# Patient Record
Sex: FEMALE | Race: WHITE | NOT HISPANIC OR LATINO | Employment: OTHER | URBAN - METROPOLITAN AREA
[De-identification: names, ages, dates, MRNs, and addresses within clinical notes are randomized per-mention and may not be internally consistent; named-entity substitution may affect disease eponyms.]

---

## 2020-01-29 ENCOUNTER — OFFICE VISIT (OUTPATIENT)
Dept: MEDICAL GROUP | Facility: IMAGING CENTER | Age: 70
End: 2020-01-29
Payer: MEDICARE

## 2020-01-29 VITALS
DIASTOLIC BLOOD PRESSURE: 84 MMHG | HEART RATE: 83 BPM | HEIGHT: 64 IN | BODY MASS INDEX: 37.9 KG/M2 | RESPIRATION RATE: 15 BRPM | TEMPERATURE: 98.8 F | SYSTOLIC BLOOD PRESSURE: 124 MMHG | WEIGHT: 222 LBS | OXYGEN SATURATION: 96 %

## 2020-01-29 DIAGNOSIS — J45.20 MILD INTERMITTENT ASTHMA WITHOUT COMPLICATION: ICD-10-CM

## 2020-01-29 DIAGNOSIS — Z12.11 COLON CANCER SCREENING: ICD-10-CM

## 2020-01-29 DIAGNOSIS — Z13.1 DIABETES MELLITUS SCREENING: ICD-10-CM

## 2020-01-29 DIAGNOSIS — Z11.59 ENCOUNTER FOR HEPATITIS C SCREENING TEST FOR LOW RISK PATIENT: ICD-10-CM

## 2020-01-29 DIAGNOSIS — Z12.39 BREAST CANCER SCREENING: ICD-10-CM

## 2020-01-29 DIAGNOSIS — H91.93 BILATERAL HEARING LOSS, UNSPECIFIED HEARING LOSS TYPE: ICD-10-CM

## 2020-01-29 DIAGNOSIS — Z12.31 ENCOUNTER FOR SCREENING MAMMOGRAM FOR MALIGNANT NEOPLASM OF BREAST: ICD-10-CM

## 2020-01-29 DIAGNOSIS — R42 VERTIGO: ICD-10-CM

## 2020-01-29 DIAGNOSIS — Z13.220 SCREENING CHOLESTEROL LEVEL: ICD-10-CM

## 2020-01-29 DIAGNOSIS — Z13.820 OSTEOPOROSIS SCREENING: ICD-10-CM

## 2020-01-29 DIAGNOSIS — Z00.00 ANNUAL PHYSICAL EXAM: ICD-10-CM

## 2020-01-29 PROCEDURE — 99214 OFFICE O/P EST MOD 30 MIN: CPT | Performed by: FAMILY MEDICINE

## 2020-01-29 RX ORDER — M-VIT,TX,IRON,MINS/CALC/FOLIC 27MG-0.4MG
1 TABLET ORAL DAILY
COMMUNITY

## 2020-01-29 SDOH — HEALTH STABILITY: MENTAL HEALTH: HOW OFTEN DO YOU HAVE A DRINK CONTAINING ALCOHOL?: NEVER

## 2020-01-29 ASSESSMENT — PAIN SCALES - GENERAL: PAINLEVEL: NO PAIN

## 2020-01-29 NOTE — PROGRESS NOTES
Subjective:     CC:    Chief Complaint   Patient presents with   • Other     mammogram 15-18 years ago. never had dexa. colonosocpy 3-herlinda years ago, came back normal.    • Vertigo     vertigo started 6-7 years ago. random attacks. can't figure out the trigger.   • Hearing Problem     left ear. progressive, now can't hear anything in left ear.       HISTORY OF THE PRESENT ILLNESS: This pleasant 69 y.o. female with no chronic medical conditions is here to establish care and discuss a few concerns. His/her prior PCP was Josey patrick in [facility].    Hearing loss started after a severe ear infection about 10 years ago she has seen an ENT for this. Last vertigo nov 2019 last 30 minutes. Once every three months she gets vertigo episodes that resolve on their own.  She also experiences ringing in her ears mostly in the left though both also for the last 7 years.  She reports that since her initial hearing loss about 10 years ago she feels recently that it is gotten worse.  She currently does not have any symptoms.      Allergies: Ampicillin and Penicillins    Current Outpatient Medications Ordered in Epic   Medication Sig Dispense Refill   • therapeutic multivitamin-minerals (THERAGRAN-M) Tab Take 1 Tab by mouth every day.     • NON SPECIFIED      • Probiotic Product (PROBIOTIC DAILY PO) Take  by mouth.       No current Epic-ordered facility-administered medications on file.        History reviewed. No pertinent past medical history.    History reviewed. No pertinent surgical history.    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   • Drug use: Never       Social History     Patient does not qualify to have social determinant information on file (likely too young).   Social History Narrative   • Not on file       History reviewed. No  "pertinent family history.    ROS:   Gen: no fevers/chills, no changes in weight  Eyes: no changes in vision  ENT: no sore throat, with hearing loss  Pulm: no sob, no cough  CV: no chest pain, no palpitations  GI: no nausea/vomiting, no diarrhea  : no dysuria  MSk: no myalgias  Skin: no rash  Neuro: no headaches, no numbness/tingling  Heme/Lymph: no easy bruising      Objective:     Exam: /84 (BP Location: Left arm, Patient Position: Sitting, BP Cuff Size: Adult)   Pulse 83   Temp 37.1 °C (98.8 °F) (Temporal)   Resp 15   Ht 1.626 m (5' 4\")   Wt 100.7 kg (222 lb)   SpO2 96%  Body mass index is 38.11 kg/m².    General: Normal appearing. No distress.  HEENT: Normocephalic. Eyes conjunctiva clear lids without ptosis, eomi. bilateral ear canals within normal limits tympanic membranes bilaterally with good light reflex no erythema no fluid behind the ear no bulging  Neck: Thyroid is not enlarged.  Pulmonary: Clear to ausculation.  Normal effort. No rales, ronchi, or wheezing.  Cardiovascular: Regular rate and rhythm without murmur. Carotid and radial pulses are intact and equal bilaterally.  Neurologic: No facial droop, normal gait, alert and oriented  Lymph: No cervical or supraclavicular lymph nodes are palpable  Skin: Warm and dry.  No obvious lesions.  Musculoskeletal: No extremity cyanosis, clubbing, or edema.  Psych: Normal mood and affect. Judgment and insight is normal.      Assessment & Plan:   69 y.o. female with the following -  Sending referral to ENT to establish care.  Discussed that without current symptoms Epley maneuver would do nothing for her.  I offered medication to try when she gets symptoms she prefers to wait.  Problem List Items Addressed This Visit     Mild intermittent asthma without complication      Other Visit Diagnoses     Encounter for hepatitis C screening test for low risk patient        Relevant Orders    HEP C VIRUS ANTIBODY    Breast cancer screening        Relevant Orders "    MA-SCREENING MAMMO BILAT W/TOMOSYNTHESIS W/CAD    Colon cancer screening        Screening cholesterol level        Relevant Orders    Lipid Profile    Diabetes mellitus screening        Annual physical exam        Relevant Orders    CBC WITH DIFFERENTIAL    Comp Metabolic Panel    Osteoporosis screening        Bilateral hearing loss, unspecified hearing loss type        Relevant Orders    POCT OAE HEARING SCREENING    REFERRAL TO ENT    Vertigo        Relevant Orders    REFERRAL TO ENT    Encounter for screening mammogram for malignant neoplasm of breast         Relevant Orders    MA-SCREENING MAMMO BILAT W/TOMOSYNTHESIS W/CAD        Problem   Mild Intermittent Asthma Without Complication    primatine mist not using labuterol       No follow-ups on file.    Please note that this dictation was created using voice recognition software. I have made every reasonable attempt to correct obvious errors, but I expect that there are errors of grammar and possibly content that I did not discover before finalizing the note.

## 2020-01-31 ENCOUNTER — HOSPITAL ENCOUNTER (OUTPATIENT)
Dept: LAB | Facility: MEDICAL CENTER | Age: 70
End: 2020-01-31
Attending: FAMILY MEDICINE
Payer: MEDICARE

## 2020-01-31 DIAGNOSIS — Z13.220 SCREENING CHOLESTEROL LEVEL: ICD-10-CM

## 2020-01-31 DIAGNOSIS — Z00.00 ANNUAL PHYSICAL EXAM: ICD-10-CM

## 2020-01-31 DIAGNOSIS — Z11.59 ENCOUNTER FOR HEPATITIS C SCREENING TEST FOR LOW RISK PATIENT: ICD-10-CM

## 2020-01-31 LAB
ALBUMIN SERPL BCP-MCNC: 4.2 G/DL (ref 3.2–4.9)
ALBUMIN/GLOB SERPL: 1.4 G/DL
ALP SERPL-CCNC: 63 U/L (ref 30–99)
ALT SERPL-CCNC: 11 U/L (ref 2–50)
ANION GAP SERPL CALC-SCNC: 6 MMOL/L (ref 0–11.9)
AST SERPL-CCNC: 16 U/L (ref 12–45)
BASOPHILS # BLD AUTO: 0.8 % (ref 0–1.8)
BASOPHILS # BLD: 0.05 K/UL (ref 0–0.12)
BILIRUB SERPL-MCNC: 0.6 MG/DL (ref 0.1–1.5)
BUN SERPL-MCNC: 17 MG/DL (ref 8–22)
CALCIUM SERPL-MCNC: 10.2 MG/DL (ref 8.5–10.5)
CHLORIDE SERPL-SCNC: 105 MMOL/L (ref 96–112)
CHOLEST SERPL-MCNC: 245 MG/DL (ref 100–199)
CO2 SERPL-SCNC: 29 MMOL/L (ref 20–33)
CREAT SERPL-MCNC: 0.86 MG/DL (ref 0.5–1.4)
EOSINOPHIL # BLD AUTO: 0.3 K/UL (ref 0–0.51)
EOSINOPHIL NFR BLD: 5 % (ref 0–6.9)
ERYTHROCYTE [DISTWIDTH] IN BLOOD BY AUTOMATED COUNT: 44.2 FL (ref 35.9–50)
GLOBULIN SER CALC-MCNC: 3.1 G/DL (ref 1.9–3.5)
GLUCOSE SERPL-MCNC: 97 MG/DL (ref 65–99)
HCT VFR BLD AUTO: 44.9 % (ref 37–47)
HCV AB SER QL: NEGATIVE
HDLC SERPL-MCNC: 46 MG/DL
HGB BLD-MCNC: 14.5 G/DL (ref 12–16)
IMM GRANULOCYTES # BLD AUTO: 0.02 K/UL (ref 0–0.11)
IMM GRANULOCYTES NFR BLD AUTO: 0.3 % (ref 0–0.9)
LDLC SERPL CALC-MCNC: 166 MG/DL
LYMPHOCYTES # BLD AUTO: 2.15 K/UL (ref 1–4.8)
LYMPHOCYTES NFR BLD: 36 % (ref 22–41)
MCH RBC QN AUTO: 29.5 PG (ref 27–33)
MCHC RBC AUTO-ENTMCNC: 32.3 G/DL (ref 33.6–35)
MCV RBC AUTO: 91.3 FL (ref 81.4–97.8)
MONOCYTES # BLD AUTO: 0.43 K/UL (ref 0–0.85)
MONOCYTES NFR BLD AUTO: 7.2 % (ref 0–13.4)
NEUTROPHILS # BLD AUTO: 3.03 K/UL (ref 2–7.15)
NEUTROPHILS NFR BLD: 50.7 % (ref 44–72)
NRBC # BLD AUTO: 0 K/UL
NRBC BLD-RTO: 0 /100 WBC
PLATELET # BLD AUTO: 262 K/UL (ref 164–446)
PMV BLD AUTO: 11 FL (ref 9–12.9)
POTASSIUM SERPL-SCNC: 4.4 MMOL/L (ref 3.6–5.5)
PROT SERPL-MCNC: 7.3 G/DL (ref 6–8.2)
RBC # BLD AUTO: 4.92 M/UL (ref 4.2–5.4)
SODIUM SERPL-SCNC: 140 MMOL/L (ref 135–145)
TRIGL SERPL-MCNC: 165 MG/DL (ref 0–149)
WBC # BLD AUTO: 6 K/UL (ref 4.8–10.8)

## 2020-01-31 PROCEDURE — 80061 LIPID PANEL: CPT

## 2020-01-31 PROCEDURE — 85025 COMPLETE CBC W/AUTO DIFF WBC: CPT

## 2020-01-31 PROCEDURE — 80053 COMPREHEN METABOLIC PANEL: CPT

## 2020-01-31 PROCEDURE — 86803 HEPATITIS C AB TEST: CPT

## 2020-01-31 PROCEDURE — 36415 COLL VENOUS BLD VENIPUNCTURE: CPT

## 2020-02-07 ENCOUNTER — OFFICE VISIT (OUTPATIENT)
Dept: MEDICAL GROUP | Facility: IMAGING CENTER | Age: 70
End: 2020-02-07
Payer: MEDICARE

## 2020-02-07 VITALS
TEMPERATURE: 98.2 F | HEART RATE: 82 BPM | DIASTOLIC BLOOD PRESSURE: 80 MMHG | SYSTOLIC BLOOD PRESSURE: 140 MMHG | WEIGHT: 222 LBS | HEIGHT: 64 IN | OXYGEN SATURATION: 94 % | BODY MASS INDEX: 37.9 KG/M2 | RESPIRATION RATE: 12 BRPM

## 2020-02-07 DIAGNOSIS — J45.20 MILD INTERMITTENT ASTHMA WITHOUT COMPLICATION: ICD-10-CM

## 2020-02-07 DIAGNOSIS — L40.9 PSORIASIS: ICD-10-CM

## 2020-02-07 DIAGNOSIS — Z23 NEED FOR VACCINATION AGAINST STREPTOCOCCUS PNEUMONIAE USING PNEUMOCOCCAL CONJUGATE VACCINE 13: ICD-10-CM

## 2020-02-07 DIAGNOSIS — Z12.83 SKIN CANCER SCREENING: ICD-10-CM

## 2020-02-07 DIAGNOSIS — Z23 NEED FOR TDAP VACCINATION: ICD-10-CM

## 2020-02-07 DIAGNOSIS — E78.2 MIXED HYPERLIPIDEMIA: ICD-10-CM

## 2020-02-07 PROCEDURE — 90472 IMMUNIZATION ADMIN EACH ADD: CPT | Performed by: FAMILY MEDICINE

## 2020-02-07 PROCEDURE — 90715 TDAP VACCINE 7 YRS/> IM: CPT | Performed by: FAMILY MEDICINE

## 2020-02-07 PROCEDURE — 90670 PCV13 VACCINE IM: CPT | Performed by: FAMILY MEDICINE

## 2020-02-07 PROCEDURE — 99214 OFFICE O/P EST MOD 30 MIN: CPT | Mod: 25 | Performed by: FAMILY MEDICINE

## 2020-02-07 PROCEDURE — G0009 ADMIN PNEUMOCOCCAL VACCINE: HCPCS | Performed by: FAMILY MEDICINE

## 2020-02-07 RX ORDER — TRIAMCINOLONE ACETONIDE 1 MG/G
1 CREAM TOPICAL 2 TIMES DAILY
Qty: 1 TUBE | Refills: 0 | Status: SHIPPED | OUTPATIENT
Start: 2020-02-07 | End: 2020-02-21

## 2020-02-07 RX ORDER — ALBUTEROL SULFATE 90 UG/1
2 AEROSOL, METERED RESPIRATORY (INHALATION) EVERY 4 HOURS PRN
Qty: 1 INHALER | Refills: 3 | Status: SHIPPED | OUTPATIENT
Start: 2020-02-07 | End: 2022-02-22 | Stop reason: SDUPTHER

## 2020-02-07 RX ORDER — ATORVASTATIN CALCIUM 10 MG/1
10 TABLET, FILM COATED ORAL
Qty: 90 TAB | Refills: 3 | Status: SHIPPED | OUTPATIENT
Start: 2020-02-07 | End: 2020-11-05 | Stop reason: SDUPTHER

## 2020-02-07 ASSESSMENT — PATIENT HEALTH QUESTIONNAIRE - PHQ9: CLINICAL INTERPRETATION OF PHQ2 SCORE: 0

## 2020-02-07 ASSESSMENT — PAIN SCALES - GENERAL: PAINLEVEL: 3=SLIGHT PAIN

## 2020-02-07 NOTE — PROGRESS NOTES
Subjective:     CC:    Chief Complaint   Patient presents with   • Dyslipidemia     discuss medication, used atorvastatin in the past with good results       HISTORY OF THE PRESENT ILLNESS: This pleasant 69 y.o. female is here to f/u labs.  Her LDL was 166 on her lipid panel.  She would like to start medication for this.  She reports that she has done well on atorvastatin in the past.  She is also interested in getting her Tdap and pneumococcal vaccine today.  She does have mild intermittent asthma usually does not bother her until springtime or if she gets an upper respiratory infection.  Today she is doing well without wheezing shortness of breath or cough.    Allergies: Ampicillin and Penicillins    Current Outpatient Medications Ordered in Epic   Medication Sig Dispense Refill   • therapeutic multivitamin-minerals (THERAGRAN-M) Tab Take 1 Tab by mouth every day.     • Probiotic Product (PROBIOTIC DAILY PO) Take  by mouth.     • NON SPECIFIED        No current Epic-ordered facility-administered medications on file.        History reviewed. No pertinent past medical history.    History reviewed. No pertinent surgical history.    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   • Drug use: Never       Social History     Patient does not qualify to have social determinant information on file (likely too young).   Social History Narrative   • Not on file       History reviewed. No pertinent family history.    ROS:   Gen: no fevers/chills, no changes in weight  Eyes: no changes in vision  ENT: no sore throat, no hearing loss  Pulm: no sob, no cough  CV: no chest pain, no palpitations  GI: no nausea/vomiting, no diarrhea  : no dysuria  MSk: no myalgias  Skin: w lesion on her chest no rash  Neuro: no headaches, no  "numbness/tingling  Heme/Lymph: no easy bruising      Objective:     Exam: /80   Pulse 82   Temp 36.8 °C (98.2 °F)   Resp 12   Ht 1.626 m (5' 4\")   Wt 100.7 kg (222 lb)   SpO2 94%  Body mass index is 38.11 kg/m².    General: Normal appearing. No distress.  HEENT: Normocephalic. Eyes conjunctiva clear lids without ptosis, eomi  Neck: Thyroid is not enlarged.  Pulmonary: Clear to ausculation.  Normal effort. No rales, ronchi, or wheezing.  Cardiovascular: Regular rate and rhythm without murmur. Carotid and radial pulses are intact and equal bilaterally.  Neurologic: No facial droop, normal gait, alert and oriented  Lymph: No cervical or supraclavicular lymph nodes are palpable  Skin: Warm and dry.  No obvious lesions. [place] colored scaling plaque the size of a dime on her upper center chest  Musculoskeletal: No extremity cyanosis, clubbing, or edema.  Psych: Normal mood and affect. Judgment and insight is normal.      Assessment & Plan:   69 y.o. female with the following -  She will let me know if the topical steroid does not resolve the lesion  Starting Lipitor today. Add coq10 if symptoms occur. Discussed most common symptoms fatigue and joint pains  Discussed mc se from vaccines. Pt would like to obtain tdap and pcv13 today. pcv 23 can be repeated 6-12 months from today.   Problem List Items Addressed This Visit     Mild intermittent asthma without complication    Relevant Medications    albuterol 108 (90 Base) MCG/ACT Aero Soln inhalation aerosol    Mixed hyperlipidemia    Relevant Medications    atorvastatin (LIPITOR) 10 MG Tab    Skin cancer screening    Relevant Orders    REFERRAL TO DERMATOLOGY    Psoriasis    Relevant Medications    triamcinolone acetonide (KENALOG) 0.1 % Cream      Other Visit Diagnoses     Need for vaccination against Streptococcus pneumoniae using pneumococcal conjugate vaccine 13        Relevant Orders    Prevnar 13 PCV-13 (Completed)    Need for Tdap vaccination        " Relevant Orders    Tdap =>8yo IM (Completed)        Problem   Mixed Hyperlipidemia    Starting statin  Add co-Q10 if fatigue or joint pains occur     Skin Cancer Screening   Psoriasis   Mild Intermittent Asthma Without Complication    primatine mist not using labuterol  Bothers her in the summer and if uri the primatine works well for her  She stopped albuterol due to insurance  Will send now       Return in about 3 months (around 5/7/2020), or if symptoms worsen or fail to improve.    Please note that this dictation was created using voice recognition software. I have made every reasonable attempt to correct obvious errors, but I expect that there are errors of grammar and possibly content that I did not discover before finalizing the note.

## 2020-03-04 ENCOUNTER — OFFICE VISIT (OUTPATIENT)
Dept: DERMATOLOGY | Facility: IMAGING CENTER | Age: 70
End: 2020-03-04
Payer: MEDICARE

## 2020-03-04 VITALS — BODY MASS INDEX: 37.56 KG/M2 | HEIGHT: 64 IN | WEIGHT: 220 LBS

## 2020-03-04 DIAGNOSIS — Z12.83 SKIN CANCER SCREENING: ICD-10-CM

## 2020-03-04 DIAGNOSIS — L81.4 LENTIGINES: ICD-10-CM

## 2020-03-04 DIAGNOSIS — D22.9 MULTIPLE NEVI: ICD-10-CM

## 2020-03-04 DIAGNOSIS — L82.1 SEBORRHEIC KERATOSES: ICD-10-CM

## 2020-03-04 DIAGNOSIS — D18.01 CHERRY ANGIOMA: ICD-10-CM

## 2020-03-04 DIAGNOSIS — L57.0 ACTINIC KERATOSES: ICD-10-CM

## 2020-03-04 PROCEDURE — 17000 DESTRUCT PREMALG LESION: CPT | Performed by: NURSE PRACTITIONER

## 2020-03-04 PROCEDURE — 17003 DESTRUCT PREMALG LES 2-14: CPT | Performed by: NURSE PRACTITIONER

## 2020-03-04 PROCEDURE — 99203 OFFICE O/P NEW LOW 30 MIN: CPT | Mod: 25 | Performed by: NURSE PRACTITIONER

## 2020-03-04 ASSESSMENT — ENCOUNTER SYMPTOMS
CHILLS: 0
FEVER: 0
WEIGHT LOSS: 0
DIAPHORESIS: 0

## 2020-03-04 ASSESSMENT — FIBROSIS 4 INDEX: FIB4 SCORE: 1.27

## 2020-03-04 NOTE — PROGRESS NOTES
Dermatology New Patient Visit    Chief Complaint   Patient presents with   • Establish Care       Subjective:     HPI:   Martha Hernandez is a 69 y.o. female presenting for    Full skin exam   HPI/location: mid chest red scaly spot   Time present: 6 mths   Painful lesion: No  Itching lesion: No  Enlarging lesion: No  Anything make it better or worse?    History of skin cancer: No  History of precancers/actinic keratoses: No  History of biopsies:No  History of blistering/severe sunburns:No  Family history of skin cancer:No  Family history of atypical moles:No          History reviewed. No pertinent past medical history.    Current Outpatient Medications on File Prior to Visit   Medication Sig Dispense Refill   • atorvastatin (LIPITOR) 10 MG Tab Take 1 Tab by mouth every bedtime. 90 Tab 3   • albuterol 108 (90 Base) MCG/ACT Aero Soln inhalation aerosol Inhale 2 Puffs by mouth every four hours as needed for Shortness of Breath. 1 Inhaler 3   • therapeutic multivitamin-minerals (THERAGRAN-M) Tab Take 1 Tab by mouth every day.     • NON SPECIFIED      • Probiotic Product (PROBIOTIC DAILY PO) Take  by mouth.       No current facility-administered medications on file prior to visit.        Allergies   Allergen Reactions   • Ampicillin Hives   • Penicillins        History reviewed. No pertinent family history.    Social History     Socioeconomic History   • Marital status:      Spouse name: Not on file   • Number of children: Not on file   • Years of education: Not on file   • Highest education level: Not on file   Occupational History   • Not on file   Social Needs   • Financial resource strain: Not on file   • Food insecurity     Worry: Not on file     Inability: Not on file   • Transportation needs     Medical: Not on file     Non-medical: Not on file   Tobacco Use   • Smoking status: Never Smoker   • Smokeless tobacco: Never Used   Substance and Sexual Activity   • Alcohol use: Not Currently     Frequency: Never  "    Comment: 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   • Drug use: Never   • Sexual activity: Not on file   Lifestyle   • Physical activity     Days per week: Not on file     Minutes per session: Not on file   • Stress: Not on file   Relationships   • Social connections     Talks on phone: Not on file     Gets together: Not on file     Attends Religion service: Not on file     Active member of club or organization: Not on file     Attends meetings of clubs or organizations: Not on file     Relationship status: Not on file   • Intimate partner violence     Fear of current or ex partner: Not on file     Emotionally abused: Not on file     Physically abused: Not on file     Forced sexual activity: Not on file   Other Topics Concern   • Not on file   Social History Narrative   • Not on file       Review of Systems   Constitutional: Negative for chills, diaphoresis, fever, malaise/fatigue and weight loss.   Skin: Negative for itching and rash.        Objective:     A full mucocutaneous exam was completed including: scalp, hair, ears, face, eyelids, conjunctiva, lips, gums/tongue/oropharynx, neck, chest breasts, abdomen, back, left and right upper extremities (including hands/digits and fingernails), left and right lower extremities (including feet/toes, toenails), buttocks, excluding external genitalia (patient refusal) with the following pertinent findings listed below. Remaining above-listed examined areas within normal limits / negative for rashes or lesions.    Ht 1.626 m (5' 4\")   Wt 99.8 kg (220 lb)     Physical Exam   Constitutional: She is oriented to person, place, and time and well-developed, well-nourished, and in no distress. No distress.   HENT:   Head: Normocephalic.   Right Ear: External ear normal.   Left Ear: External ear normal.   Mouth/Throat: Oropharynx is clear and moist.   -Ill-defined erythematous gritty/scaly papule over left eyebrow, left cheek, and tip of nose       Eyes: Conjunctivae are " normal.   Neck: Neck supple.   Pulmonary/Chest: Effort normal.   Lymphadenopathy:     She has no cervical adenopathy.   Neurological: She is alert and oriented to person, place, and time.   Skin: Skin is warm and dry. No rash noted. There is erythema.        -Several tan medium brown skin-colored stuck-on waxy papules scattered on the trunk and extremities    -Multiple light brown skin-colored papules scattered over the face, trunk >> extremities. All with benign-appearing pigment network patterns on dermoscopy    -Few scattered 1-3mm bright red macules and thin papules on the trunk and extremities    Several scattered tan and light brown macules over trunk and extremities       Psychiatric: Mood and affect normal.   Vitals reviewed.      DATA: none applicable to review    Assessment and Plan:     1. Actinic keratoses  CRYOTHERAPY:  Risks (including, but not limited to: hypo or hyperpigmentation, redness, blister, blood blister, recurrence, need for further treatment, infection, scar) and benefits of cryotherapy discussed. Patient verbally agreed to proceed with treatment. 2 cryotherapy freeze thaw cycles of 10 seconds were applied to 4 lesions on chest and face with cryac. Patient tolerated procedure well. Aftercare instructions given.  -will re-check AK at chest in 6 weeks and if not resolved, plan on biopsy    2. Multiple nevi  - Benign-appearing nature of lesions discussed. Advised to return to clinic for any new or concerning changes.  - ABCDE's of melanoma discussed      3. Seborrheic keratoses  - Benign-appearing nature of lesions discussed. Advised to return to clinic for any new or concerning changes.      4. Lentigines  - Discussed benign nature of lesions, related to sun exposure  - Discussed sun protection, hand out provided      5. Cherry angioma  - Benign-appearing nature of lesions discussed. Advised to return to clinic for any new or concerning changes.      6. Skin cancer screening  Skin cancer  education  - discussed importance of sun protective clothing, eyewear  - discussed importance of daily use of broad spectrum sunscreen with SPF 30 or greater, as well as need for reapplication ~every 2 hours when exposed to UVR  - discussed importance of regular self-exams, ideally once per month, every 12 months exams in clinic  - ABCDE's of melanoma discussed  - patient to bring any new or concerning lesions to my attention  - Patient educational handout provided and reviewed with patient        Followup: Return in about 6 weeks (around 4/15/2020) for spot check or sooner for any concerns.    JAZZY Chandler.

## 2020-03-10 ENCOUNTER — HOSPITAL ENCOUNTER (OUTPATIENT)
Dept: RADIOLOGY | Facility: MEDICAL CENTER | Age: 70
End: 2020-03-10
Attending: FAMILY MEDICINE
Payer: MEDICARE

## 2020-03-10 DIAGNOSIS — Z12.39 BREAST CANCER SCREENING: ICD-10-CM

## 2020-03-10 DIAGNOSIS — Z12.31 ENCOUNTER FOR SCREENING MAMMOGRAM FOR MALIGNANT NEOPLASM OF BREAST: ICD-10-CM

## 2020-03-10 PROCEDURE — 77067 SCR MAMMO BI INCL CAD: CPT

## 2020-03-13 ENCOUNTER — HOSPITAL ENCOUNTER (OUTPATIENT)
Dept: RADIOLOGY | Facility: MEDICAL CENTER | Age: 70
End: 2020-03-13
Attending: FAMILY MEDICINE
Payer: MEDICARE

## 2020-03-13 DIAGNOSIS — R92.8 ABNORMAL MAMMOGRAM: ICD-10-CM

## 2020-03-13 PROCEDURE — 77065 DX MAMMO INCL CAD UNI: CPT | Mod: LT

## 2020-03-13 PROCEDURE — 76642 ULTRASOUND BREAST LIMITED: CPT | Mod: LT

## 2020-05-05 ENCOUNTER — TELEPHONE (OUTPATIENT)
Dept: DERMATOLOGY | Facility: IMAGING CENTER | Age: 70
End: 2020-05-05

## 2020-05-05 NOTE — TELEPHONE ENCOUNTER
Called pt about Derm visit changes to virtual visits. Was due to come in 5/12 for spot check. Pt stated that spot looks much better, little to no redness and almost gone. She was okay to cancel for now. She was advised to call back should things change.

## 2020-08-17 ENCOUNTER — PATIENT MESSAGE (OUTPATIENT)
Dept: MEDICAL GROUP | Facility: IMAGING CENTER | Age: 70
End: 2020-08-17

## 2020-08-17 DIAGNOSIS — R92.8 ABNORMAL MAMMOGRAM: ICD-10-CM

## 2020-08-21 NOTE — PROGRESS NOTES
1.  There is a probable island of breast tissue seen best on the MLO view at the level the nipple.  2.  Incidental possible lymph node at the 9:00 position.  3.  A 6 month follow-up left diagnostic mammogram and targeted left ultrasound is recommended.     These results were given to the patient at the time of visit.     R3 - Category 3:  Probably Benign Finding - Short Interval Follow-Up Suggested

## 2020-11-05 DIAGNOSIS — E78.2 MIXED HYPERLIPIDEMIA: ICD-10-CM

## 2020-11-06 RX ORDER — ATORVASTATIN CALCIUM 10 MG/1
10 TABLET, FILM COATED ORAL
Qty: 100 TAB | Refills: 3 | Status: SHIPPED | OUTPATIENT
Start: 2020-11-06 | End: 2021-11-10

## 2021-01-15 DIAGNOSIS — Z23 NEED FOR VACCINATION: ICD-10-CM

## 2021-02-19 ENCOUNTER — IMMUNIZATION (OUTPATIENT)
Dept: FAMILY PLANNING/WOMEN'S HEALTH CLINIC | Facility: IMMUNIZATION CENTER | Age: 71
End: 2021-02-19
Attending: INTERNAL MEDICINE
Payer: MEDICARE

## 2021-02-19 DIAGNOSIS — Z23 ENCOUNTER FOR VACCINATION: Primary | ICD-10-CM

## 2021-02-19 PROCEDURE — 91300 PFIZER SARS-COV-2 VACCINE: CPT

## 2021-02-19 PROCEDURE — 0002A PFIZER SARS-COV-2 VACCINE: CPT

## 2021-06-25 SDOH — ECONOMIC STABILITY: TRANSPORTATION INSECURITY
IN THE PAST 12 MONTHS, HAS LACK OF TRANSPORTATION KEPT YOU FROM MEETINGS, WORK, OR FROM GETTING THINGS NEEDED FOR DAILY LIVING?: NO

## 2021-06-25 SDOH — ECONOMIC STABILITY: HOUSING INSECURITY: IN THE LAST 12 MONTHS, HOW MANY PLACES HAVE YOU LIVED?: 1

## 2021-06-25 SDOH — ECONOMIC STABILITY: FOOD INSECURITY: WITHIN THE PAST 12 MONTHS, THE FOOD YOU BOUGHT JUST DIDN'T LAST AND YOU DIDN'T HAVE MONEY TO GET MORE.: NEVER TRUE

## 2021-06-25 SDOH — ECONOMIC STABILITY: FOOD INSECURITY: WITHIN THE PAST 12 MONTHS, YOU WORRIED THAT YOUR FOOD WOULD RUN OUT BEFORE YOU GOT MONEY TO BUY MORE.: SOMETIMES TRUE

## 2021-06-25 SDOH — ECONOMIC STABILITY: INCOME INSECURITY: IN THE LAST 12 MONTHS, WAS THERE A TIME WHEN YOU WERE NOT ABLE TO PAY THE MORTGAGE OR RENT ON TIME?: NO

## 2021-06-25 SDOH — HEALTH STABILITY: PHYSICAL HEALTH: ON AVERAGE, HOW MANY DAYS PER WEEK DO YOU ENGAGE IN MODERATE TO STRENUOUS EXERCISE (LIKE A BRISK WALK)?: 0 DAYS

## 2021-06-25 SDOH — HEALTH STABILITY: MENTAL HEALTH
STRESS IS WHEN SOMEONE FEELS TENSE, NERVOUS, ANXIOUS, OR CAN'T SLEEP AT NIGHT BECAUSE THEIR MIND IS TROUBLED. HOW STRESSED ARE YOU?: NOT AT ALL

## 2021-06-25 SDOH — ECONOMIC STABILITY: HOUSING INSECURITY
IN THE LAST 12 MONTHS, WAS THERE A TIME WHEN YOU DID NOT HAVE A STEADY PLACE TO SLEEP OR SLEPT IN A SHELTER (INCLUDING NOW)?: NO

## 2021-06-25 SDOH — HEALTH STABILITY: PHYSICAL HEALTH: ON AVERAGE, HOW MANY MINUTES DO YOU ENGAGE IN EXERCISE AT THIS LEVEL?: 0 MIN

## 2021-06-25 SDOH — ECONOMIC STABILITY: TRANSPORTATION INSECURITY
IN THE PAST 12 MONTHS, HAS THE LACK OF TRANSPORTATION KEPT YOU FROM MEDICAL APPOINTMENTS OR FROM GETTING MEDICATIONS?: NO

## 2021-06-25 SDOH — ECONOMIC STABILITY: INCOME INSECURITY: HOW HARD IS IT FOR YOU TO PAY FOR THE VERY BASICS LIKE FOOD, HOUSING, MEDICAL CARE, AND HEATING?: SOMEWHAT HARD

## 2021-06-25 SDOH — ECONOMIC STABILITY: TRANSPORTATION INSECURITY
IN THE PAST 12 MONTHS, HAS LACK OF RELIABLE TRANSPORTATION KEPT YOU FROM MEDICAL APPOINTMENTS, MEETINGS, WORK OR FROM GETTING THINGS NEEDED FOR DAILY LIVING?: NO

## 2021-06-25 ASSESSMENT — SOCIAL DETERMINANTS OF HEALTH (SDOH)
HOW OFTEN DO YOU GET TOGETHER WITH FRIENDS OR RELATIVES?: MORE THAN THREE TIMES A WEEK
HOW OFTEN DO YOU GET TOGETHER WITH FRIENDS OR RELATIVES?: MORE THAN THREE TIMES A WEEK
HOW OFTEN DO YOU ATTEND CHURCH OR RELIGIOUS SERVICES?: NEVER
WITHIN THE PAST 12 MONTHS, YOU WORRIED THAT YOUR FOOD WOULD RUN OUT BEFORE YOU GOT THE MONEY TO BUY MORE: SOMETIMES TRUE
IN A TYPICAL WEEK, HOW MANY TIMES DO YOU TALK ON THE PHONE WITH FAMILY, FRIENDS, OR NEIGHBORS?: NEVER
DO YOU BELONG TO ANY CLUBS OR ORGANIZATIONS SUCH AS CHURCH GROUPS UNIONS, FRATERNAL OR ATHLETIC GROUPS, OR SCHOOL GROUPS?: YES
DO YOU BELONG TO ANY CLUBS OR ORGANIZATIONS SUCH AS CHURCH GROUPS UNIONS, FRATERNAL OR ATHLETIC GROUPS, OR SCHOOL GROUPS?: YES
IN A TYPICAL WEEK, HOW MANY TIMES DO YOU TALK ON THE PHONE WITH FAMILY, FRIENDS, OR NEIGHBORS?: NEVER
HOW HARD IS IT FOR YOU TO PAY FOR THE VERY BASICS LIKE FOOD, HOUSING, MEDICAL CARE, AND HEATING?: SOMEWHAT HARD
HOW OFTEN DO YOU ATTEND CHURCH OR RELIGIOUS SERVICES?: NEVER

## 2021-06-29 ENCOUNTER — OFFICE VISIT (OUTPATIENT)
Dept: MEDICAL GROUP | Facility: IMAGING CENTER | Age: 71
End: 2021-06-29
Payer: MEDICARE

## 2021-06-29 VITALS
SYSTOLIC BLOOD PRESSURE: 136 MMHG | OXYGEN SATURATION: 96 % | WEIGHT: 221 LBS | DIASTOLIC BLOOD PRESSURE: 82 MMHG | HEIGHT: 65 IN | TEMPERATURE: 98.3 F | RESPIRATION RATE: 12 BRPM | BODY MASS INDEX: 36.82 KG/M2 | HEART RATE: 84 BPM

## 2021-06-29 DIAGNOSIS — G89.29 CHRONIC PAIN OF LEFT KNEE: ICD-10-CM

## 2021-06-29 DIAGNOSIS — J45.20 MILD INTERMITTENT ASTHMA WITHOUT COMPLICATION: ICD-10-CM

## 2021-06-29 DIAGNOSIS — L82.1 SEBORRHEIC KERATOSIS: ICD-10-CM

## 2021-06-29 DIAGNOSIS — M25.562 CHRONIC PAIN OF LEFT KNEE: ICD-10-CM

## 2021-06-29 DIAGNOSIS — H91.93 BILATERAL HEARING LOSS, UNSPECIFIED HEARING LOSS TYPE: ICD-10-CM

## 2021-06-29 PROCEDURE — 8041 PR SCP AHA: Performed by: FAMILY MEDICINE

## 2021-06-29 PROCEDURE — 99214 OFFICE O/P EST MOD 30 MIN: CPT | Performed by: FAMILY MEDICINE

## 2021-06-29 RX ORDER — DEXAMETHASONE 4 MG/1
1 TABLET ORAL 2 TIMES DAILY
Qty: 12 G | Refills: 6 | Status: SHIPPED | OUTPATIENT
Start: 2021-06-29 | End: 2021-07-20

## 2021-06-29 ASSESSMENT — PATIENT HEALTH QUESTIONNAIRE - PHQ9: CLINICAL INTERPRETATION OF PHQ2 SCORE: 0

## 2021-06-29 ASSESSMENT — PAIN SCALES - GENERAL: PAINLEVEL: 4=SLIGHT-MODERATE PAIN

## 2021-06-29 ASSESSMENT — FIBROSIS 4 INDEX: FIB4 SCORE: 1.29
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
## 2021-07-07 ENCOUNTER — HOSPITAL ENCOUNTER (OUTPATIENT)
Dept: RADIOLOGY | Facility: MEDICAL CENTER | Age: 71
End: 2021-07-07
Attending: FAMILY MEDICINE
Payer: MEDICARE

## 2021-07-07 DIAGNOSIS — G89.29 CHRONIC PAIN OF LEFT KNEE: ICD-10-CM

## 2021-07-07 DIAGNOSIS — M25.562 CHRONIC PAIN OF LEFT KNEE: ICD-10-CM

## 2021-07-07 PROCEDURE — 73562 X-RAY EXAM OF KNEE 3: CPT | Mod: LT

## 2021-07-12 ENCOUNTER — HOSPITAL ENCOUNTER (OUTPATIENT)
Dept: RADIOLOGY | Facility: MEDICAL CENTER | Age: 71
End: 2021-07-12
Attending: FAMILY MEDICINE
Payer: MEDICARE

## 2021-07-12 DIAGNOSIS — Z12.31 VISIT FOR SCREENING MAMMOGRAM: ICD-10-CM

## 2021-07-20 ENCOUNTER — OFFICE VISIT (OUTPATIENT)
Dept: MEDICAL GROUP | Facility: IMAGING CENTER | Age: 71
End: 2021-07-20
Payer: MEDICARE

## 2021-07-20 VITALS
BODY MASS INDEX: 36.65 KG/M2 | SYSTOLIC BLOOD PRESSURE: 126 MMHG | DIASTOLIC BLOOD PRESSURE: 70 MMHG | OXYGEN SATURATION: 94 % | WEIGHT: 220 LBS | HEIGHT: 65 IN | HEART RATE: 83 BPM | TEMPERATURE: 99 F

## 2021-07-20 DIAGNOSIS — Z13.1 SCREENING FOR DIABETES MELLITUS (DM): ICD-10-CM

## 2021-07-20 DIAGNOSIS — Z23 NEED FOR VACCINATION: ICD-10-CM

## 2021-07-20 DIAGNOSIS — Z12.11 COLON CANCER SCREENING: ICD-10-CM

## 2021-07-20 DIAGNOSIS — E78.2 MIXED HYPERLIPIDEMIA: ICD-10-CM

## 2021-07-20 PROCEDURE — G0009 ADMIN PNEUMOCOCCAL VACCINE: HCPCS | Performed by: FAMILY MEDICINE

## 2021-07-20 PROCEDURE — 90732 PPSV23 VACC 2 YRS+ SUBQ/IM: CPT | Performed by: FAMILY MEDICINE

## 2021-07-20 PROCEDURE — G0439 PPPS, SUBSEQ VISIT: HCPCS | Mod: 25 | Performed by: FAMILY MEDICINE

## 2021-07-20 ASSESSMENT — ENCOUNTER SYMPTOMS: GENERAL WELL-BEING: GOOD

## 2021-07-20 ASSESSMENT — FIBROSIS 4 INDEX: FIB4 SCORE: 1.31

## 2021-07-20 ASSESSMENT — PAIN SCALES - GENERAL: PAINLEVEL: NO PAIN

## 2021-07-20 ASSESSMENT — ACTIVITIES OF DAILY LIVING (ADL): BATHING_REQUIRES_ASSISTANCE: 0

## 2021-07-20 ASSESSMENT — PATIENT HEALTH QUESTIONNAIRE - PHQ9: CLINICAL INTERPRETATION OF PHQ2 SCORE: 0

## 2021-07-20 NOTE — PROGRESS NOTES
Annual Health Assessment Questions:    1.  Are you currently engaging in any exercise or physical activity? No    2.  How would you describe your mood or emotional well-being today? good    3.  Have you had any falls in the last year? No    4.  Have you noticed any problems with your balance or had difficulty walking? Yes, knee has been giving pt trouble. Has appt with Orthopedist upcoming.    5.  In the last six months have you experienced any leakage of urine? No    6. DPA/Advanced Directive: Patient has Advanced Directive, but it is not on file. Instructed to bring in a copy to scan into their chart.

## 2021-07-20 NOTE — PROGRESS NOTES
Chief Complaint   Patient presents with   • Annual Exam       HPI:  Martha is a 71 y.o. here for Medicare Annual Wellness Visit      Patient Active Problem List    Diagnosis Date Noted   • Mixed hyperlipidemia 02/07/2020   • Skin cancer screening 02/07/2020   • Psoriasis 02/07/2020   • Mild intermittent asthma without complication 01/29/2020       Current Outpatient Medications   Medication Sig Dispense Refill   • atorvastatin (LIPITOR) 10 MG Tab Take 1 Tab by mouth every bedtime. 100 Tab 3   • Krill Oil 500 MG Cap      • albuterol 108 (90 Base) MCG/ACT Aero Soln inhalation aerosol Inhale 2 Puffs by mouth every four hours as needed for Shortness of Breath. 1 Inhaler 3   • therapeutic multivitamin-minerals (THERAGRAN-M) Tab Take 1 Tab by mouth every day.       No current facility-administered medications for this visit.        Patient is taking medications as noted in medication list.  Current supplements as per medication list.     Allergies: Ampicillin and Penicillins    Current social contact/activities: Patients daughter and boyfriend live with her, no other social activity.    Is patient current with immunizations? No, Shingles vaccine. Patient interested in receiving today.    She  reports that she has never smoked. She has never used smokeless tobacco. She reports previous alcohol use. She reports that she does not use drugs.  Counseling given: Not Answered        DPA/Advanced directive: Patient has Advanced Directive, but it is not on file. Instructed to bring in a copy to scan into their chart.    ROS:    Gait: Uses no assistive device   Ostomy: No   Other tubes: No   Amputations: No   Chronic oxygen use No   Last eye exam: 1.5 year ago, January-February 2020  Wears hearing aids: No   : Denies any urinary leakage during the last 6 months      Screening:    See orders    Depression Screening    Little interest or pleasure in doing things?  0 - not at all  Feeling down, depressed, or hopeless? 0 - not at  all  Patient Health Questionnaire Score: 0    If depressive symptoms identified deferred to follow up visit unless specifically addressed in assessment and plan.    Interpretation of PHQ-9 Total Score   Score Severity   1-4 No Depression   5-9 Mild Depression   10-14 Moderate Depression   15-19 Moderately Severe Depression   20-27 Severe Depression    Screening for Cognitive Impairment    Three Minute Recall (captain, garden, picture)  2/3    Sb clock face with all 12 numbers and set the hands to show 5 past 8.  Yes    If cognitive concerns identified, deferred for follow up unless specifically addressed in assessment and plan.    Fall Risk Assessment    Has the patient had two or more falls in the last year or any fall with injury in the last year?  No  If fall risk identified, deferred for follow up unless specifically addressed in assessment and plan.    Safety Assessment    Throw rugs on floor.  Yes  Handrails on all stairs.  Yes  Good lighting in all hallways.  Yes  Difficulty hearing.  Yes  Patient counseled about all safety risks that were identified.    Functional Assessment ADLs    Are there any barriers preventing you from cooking for yourself or meeting nutritional needs?  No.    Are there any barriers preventing you from driving safely or obtaining transportation?  No.    Are there any barriers preventing you from using a telephone or calling for help?  No.    Are there any barriers preventing you from shopping?  No.    Are there any barriers preventing you from taking care of your own finances?  No.    Are there any barriers preventing you from managing your medications?  No.    Are there any barriers preventing you from showering, bathing or dressing yourself?  No.    Are you currently engaging in any exercise or physical activity?  No.     What is your perception of your health?  Good.    Health Maintenance Summary                COLONOSCOPY Overdue 7/10/2000     IMM ZOSTER VACCINES Overdue  "7/10/2000     BONE DENSITY Overdue 7/10/2015     MAMMOGRAM Overdue 3/10/2021      Done 3/10/2020 MA-SCREENING MAMMO BILAT W/TOMOSYNTHESIS W/CAD    IMM INFLUENZA Next Due 9/1/2021      Done 10/23/2020 Ext Imm: Fluzone High-Dose Quad    IMM DTaP/Tdap/Td Vaccine Next Due 2/7/2030      Done 2/7/2020 Imm Admin: Tdap Vaccine          Patient Care Team:  Marielle Pope M.D. as PCP - General (Family Medicine)    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   • Drug use: Never     History reviewed. No pertinent family history.  She  has no past medical history on file.   History reviewed. No pertinent surgical history.        Exam:     /70 (BP Location: Left arm, Patient Position: Sitting, BP Cuff Size: Adult)   Pulse 83   Temp 37.2 °C (99 °F) (Temporal)   Ht 1.651 m (5' 5\")   Wt 99.8 kg (220 lb)   SpO2 94%  Body mass index is 36.61 kg/m².    Hearing excellent.    Dentition good  Alert, oriented in no acute distress.  Eye contact is good, speech goal directed, affect calm      Assessment and Plan. The following treatment and monitoring plan is recommended:    Patient reports she has not done her colonoscopy due to cost constraints. Will send occult blood test. Also cost is an issue with dexa. discussed ways to improve bone density.  Patient does not drink alcohol at all.  She drinks 2-1/2 to 3 units of caffeine per day.  Discussed weightbearing exercises.  Weight management.  Patient does not smoke. mammo scheduled.   1. Colon cancer screening  OCCULT BLOOD FECES IMMUNOASSAY   2. Mixed hyperlipidemia  Lipid Profile   3. Screening for diabetes mellitus (DM)  Comp Metabolic Panel   4. Need for vaccination  Pneumovax Vaccine (PPSV23)         Services suggested: No services needed at this time  Health Care " Screening recommendations as per orders if indicated.  Referrals offered: PT/OT/Nutrition counseling/Behavioral Health/Smoking cessation as per orders if indicated.    Discussion today about general wellness and lifestyle habits:    · Prevent falls and reduce trip hazards; Cautioned about securing or removing rugs.  · Have a working fire alarm and carbon monoxide detector;   · Engage in regular physical activity and social activities       Follow-up: Return for annual.

## 2021-07-26 ENCOUNTER — APPOINTMENT (OUTPATIENT)
Dept: RADIOLOGY | Facility: MEDICAL CENTER | Age: 71
End: 2021-07-26
Attending: FAMILY MEDICINE
Payer: MEDICARE

## 2021-07-28 ENCOUNTER — HOSPITAL ENCOUNTER (OUTPATIENT)
Dept: LAB | Facility: MEDICAL CENTER | Age: 71
End: 2021-07-28
Attending: FAMILY MEDICINE
Payer: MEDICARE

## 2021-07-28 ENCOUNTER — HOSPITAL ENCOUNTER (OUTPATIENT)
Dept: RADIOLOGY | Facility: MEDICAL CENTER | Age: 71
End: 2021-07-28
Attending: FAMILY MEDICINE
Payer: MEDICARE

## 2021-07-28 DIAGNOSIS — R92.8 FOLLOW-UP EXAMINATION OF ABNORMAL MAMMOGRAM: ICD-10-CM

## 2021-07-28 DIAGNOSIS — R92.8 ABNORMAL FINDING ON BREAST IMAGING: ICD-10-CM

## 2021-07-28 DIAGNOSIS — E78.2 MIXED HYPERLIPIDEMIA: ICD-10-CM

## 2021-07-28 DIAGNOSIS — Z13.1 SCREENING FOR DIABETES MELLITUS (DM): ICD-10-CM

## 2021-07-28 LAB
ALBUMIN SERPL BCP-MCNC: 4.2 G/DL (ref 3.2–4.9)
ALBUMIN/GLOB SERPL: 1.3 G/DL
ALP SERPL-CCNC: 96 U/L (ref 30–99)
ALT SERPL-CCNC: 14 U/L (ref 2–50)
ANION GAP SERPL CALC-SCNC: 13 MMOL/L (ref 7–16)
AST SERPL-CCNC: 21 U/L (ref 12–45)
BILIRUB SERPL-MCNC: 0.6 MG/DL (ref 0.1–1.5)
BUN SERPL-MCNC: 23 MG/DL (ref 8–22)
CALCIUM SERPL-MCNC: 9.6 MG/DL (ref 8.5–10.5)
CHLORIDE SERPL-SCNC: 105 MMOL/L (ref 96–112)
CHOLEST SERPL-MCNC: 175 MG/DL (ref 100–199)
CO2 SERPL-SCNC: 22 MMOL/L (ref 20–33)
CREAT SERPL-MCNC: 0.79 MG/DL (ref 0.5–1.4)
FASTING STATUS PATIENT QL REPORTED: NORMAL
GLOBULIN SER CALC-MCNC: 3.2 G/DL (ref 1.9–3.5)
GLUCOSE SERPL-MCNC: 104 MG/DL (ref 65–99)
HDLC SERPL-MCNC: 51 MG/DL
LDLC SERPL CALC-MCNC: 97 MG/DL
POTASSIUM SERPL-SCNC: 4.6 MMOL/L (ref 3.6–5.5)
PROT SERPL-MCNC: 7.4 G/DL (ref 6–8.2)
SODIUM SERPL-SCNC: 140 MMOL/L (ref 135–145)
TRIGL SERPL-MCNC: 136 MG/DL (ref 0–149)

## 2021-07-28 PROCEDURE — G0279 TOMOSYNTHESIS, MAMMO: HCPCS

## 2021-07-28 PROCEDURE — 80061 LIPID PANEL: CPT

## 2021-07-28 PROCEDURE — 36415 COLL VENOUS BLD VENIPUNCTURE: CPT

## 2021-07-28 PROCEDURE — 76642 ULTRASOUND BREAST LIMITED: CPT | Mod: RT

## 2021-07-28 PROCEDURE — 80053 COMPREHEN METABOLIC PANEL: CPT

## 2021-07-30 ENCOUNTER — HOSPITAL ENCOUNTER (OUTPATIENT)
Facility: MEDICAL CENTER | Age: 71
End: 2021-07-30
Attending: FAMILY MEDICINE
Payer: MEDICARE

## 2021-07-30 PROCEDURE — 82274 ASSAY TEST FOR BLOOD FECAL: CPT

## 2021-08-03 DIAGNOSIS — Z12.11 COLON CANCER SCREENING: ICD-10-CM

## 2021-08-05 LAB — IMM ASSAY OCC BLD FITOB: NEGATIVE

## 2021-08-27 ENCOUNTER — OFFICE VISIT (OUTPATIENT)
Dept: DERMATOLOGY | Facility: IMAGING CENTER | Age: 71
End: 2021-08-27
Payer: MEDICARE

## 2021-08-27 ENCOUNTER — PATIENT MESSAGE (OUTPATIENT)
Dept: HEALTH INFORMATION MANAGEMENT | Facility: OTHER | Age: 71
End: 2021-08-27

## 2021-08-27 DIAGNOSIS — L57.0 ACTINIC KERATOSES: ICD-10-CM

## 2021-08-27 DIAGNOSIS — D48.5 NEOPLASM OF UNCERTAIN BEHAVIOR OF SKIN: ICD-10-CM

## 2021-08-27 PROCEDURE — 11102 TANGNTL BX SKIN SINGLE LES: CPT | Performed by: NURSE PRACTITIONER

## 2021-08-27 PROCEDURE — 17000 DESTRUCT PREMALG LESION: CPT | Mod: 59 | Performed by: NURSE PRACTITIONER

## 2021-08-27 PROCEDURE — 11103 TANGNTL BX SKIN EA SEP/ADDL: CPT | Performed by: NURSE PRACTITIONER

## 2021-08-27 ASSESSMENT — ENCOUNTER SYMPTOMS
FEVER: 0
CHILLS: 0
WEIGHT LOSS: 0
DIAPHORESIS: 0

## 2021-08-27 NOTE — PROGRESS NOTES
"Dermatology Return Patient Visit    Chief Complaint   Patient presents with   • Follow-Up     ONI        Subjective:     HPI:   Martha Hernandez is a 71 y.o. female presenting for    Follow up  Skin lesions   Last exam 03/2020   Irritated \"moles\"-one at left inframamary and one at right flank. Both get caught on bra and and very irritated. Both seem to be enlarging.     AKs at chest and face treated at visit on 3/2020-pt states these have resolved.     History of skin cancer: No  History of precancers/actinic keratoses: yes-treated with LN on 3/2020  History of biopsies:No  History of blistering/severe sunburns:No  Family history of skin cancer:No  Family history of atypical moles:No          History reviewed. No pertinent past medical history.    Current Outpatient Medications on File Prior to Visit   Medication Sig Dispense Refill   • atorvastatin (LIPITOR) 10 MG Tab Take 1 Tab by mouth every bedtime. 100 Tab 3   • Krill Oil 500 MG Cap      • albuterol 108 (90 Base) MCG/ACT Aero Soln inhalation aerosol Inhale 2 Puffs by mouth every four hours as needed for Shortness of Breath. 1 Inhaler 3   • therapeutic multivitamin-minerals (THERAGRAN-M) Tab Take 1 Tab by mouth every day.       No current facility-administered medications on file prior to visit.       Allergies   Allergen Reactions   • Ampicillin Hives   • Penicillins        History reviewed. No pertinent family history.    Social History     Socioeconomic History   • Marital status:      Spouse name: Not on file   • Number of children: Not on file   • Years of education: Not on file   • Highest education level: 12th grade   Occupational History   • Not on file   Tobacco Use   • Smoking status: Never Smoker   • Smokeless tobacco: Never Used   Vaping Use   • Vaping Use: Never used   Substance and Sexual Activity   • Alcohol use: Not Currently     Comment: 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   • Drug use: Never   • Sexual activity: Not Currently     " Partners: Male   Other Topics Concern   • Not on file   Social History Narrative   • Not on file     Social Determinants of Health     Financial Resource Strain: Medium Risk   • Difficulty of Paying Living Expenses: Somewhat hard   Food Insecurity: Food Insecurity Present   • Worried About Running Out of Food in the Last Year: Sometimes true   • Ran Out of Food in the Last Year: Never true   Transportation Needs: No Transportation Needs   • Lack of Transportation (Medical): No   • Lack of Transportation (Non-Medical): No   Physical Activity: Inactive   • Days of Exercise per Week: 0 days   • Minutes of Exercise per Session: 0 min   Stress: No Stress Concern Present   • Feeling of Stress : Not at all   Social Connections: Moderately Isolated   • Frequency of Communication with Friends and Family: Never   • Frequency of Social Gatherings with Friends and Family: More than three times a week   • Attends Worship Services: Never   • Active Member of Clubs or Organizations: Yes   • Attends Club or Organization Meetings: Not on file   • Marital Status:    Intimate Partner Violence:    • Fear of Current or Ex-Partner:    • Emotionally Abused:    • Physically Abused:    • Sexually Abused:        Review of Systems   Constitutional: Negative for chills, diaphoresis, fever, malaise/fatigue and weight loss.   Skin: Negative for itching and rash.        Objective:     A full mucocutaneous exam was completed including: scalp, hair, ears, face, eyelids, conjunctiva, lips, gums/tongue/oropharynx, neck, chest breasts, abdomen, back, left and right upper extremities (including hands/digits and fingernails), left and right lower extremities (including feet/toes, toenails), buttocks, excluding external genitalia (patient refusal) with the following pertinent findings listed below. Remaining above-listed examined areas within normal limits / negative for rashes or lesions.    There were no vitals taken for this  visit.    Physical Exam  Vitals reviewed.   Constitutional:       General: She is not in acute distress.  HENT:      Head: Normocephalic.        Right Ear: External ear normal.      Left Ear: External ear normal.   Eyes:      Conjunctiva/sclera: Conjunctivae normal.   Pulmonary:      Effort: Pulmonary effort is normal.   Musculoskeletal:      Cervical back: Neck supple.   Lymphadenopathy:      Cervical: No cervical adenopathy.   Skin:     General: Skin is warm and dry.      Findings: No erythema or rash.             Comments:   No residual AK's noted to chest.   Neurological:      Mental Status: She is alert and oriented to person, place, and time.   Psychiatric:         Mood and Affect: Mood and affect normal.         DATA: none applicable to review    Assessment and Plan:       1. Neoplasm of uncertain behavior of skinx2  Procedure Note   Procedure: Biopsy by shave technique  Location: Left inframammary  Size: as noted in exam  Preoperative diagnosis: Nevus rule out atypia  Risks, benefits and alternatives of procedure discussed and written informed consent obtained for procedure and verbal consent for photos. Time out completed. Area of biopsy prepped with alcohol. Anesthesia with 1% lidocaine with epinephrine administered with 30 gauge needle. Shave biopsy of the site performed. Hemostasis achieved with pressure and aluminum chloride. Vaseline applied to wound with bandage. Patient tolerated procedure well and there were no complications. The specimen was sent to the pathology lab by the staff. Wound care was discussed.    Procedure Note   Procedure: Biopsy by shave technique  Location: Right flank  Size: as noted in exam  Preoperative diagnosis:SK rule out atypia  Risks, benefits and alternatives of procedure discussed and written informed consent obtained for procedure and verbal consent for photos. Time out completed. Area of biopsy prepped with alcohol. Anesthesia with 1% lidocaine with epinephrine  administered with 30 gauge needle. Shave biopsy of the site performed. Hemostasis achieved with pressure and aluminum chloride. Vaseline applied to wound with bandage. Patient tolerated procedure well and there were no complications. The specimen was sent to the pathology lab by the staff. Wound care was discussed.      2. Actinic keratoses  CRYOTHERAPY:  Risks (including, but not limited to: hypo or hyperpigmentation, redness, blister, blood blister, recurrence, need for further treatment, infection, scar) and benefits of cryotherapy discussed. Patient verbally agreed to proceed with treatment. 2 cryotherapy freeze thaw cycles of 10 seconds were applied to 1 lesion on above left eyebrow with cryac. Patient tolerated procedure well. Aftercare instructions given.      I have performed a physical exam and reviewed and updated ROS and Plan today (8/27/2021). In review of dermatology visit (3/4/2020) there are no changes except as documented above.       Followup: Return for pending biopsy .    JAZZY Chandler.

## 2021-09-01 ENCOUNTER — TELEPHONE (OUTPATIENT)
Dept: DERMATOLOGY | Facility: IMAGING CENTER | Age: 71
End: 2021-09-01

## 2021-09-01 NOTE — TELEPHONE ENCOUNTER
Patient notified of D- path results both Bx right flank and Left inframammary are benign . No further Treatment is needed . Scanned in media tab

## 2021-09-01 NOTE — TELEPHONE ENCOUNTER
LVM for patient to return my call at Dermatology. I'm calling with patient's pathology results and wanted to discuss. Office phone number provided.

## 2021-10-21 ENCOUNTER — TELEPHONE (OUTPATIENT)
Dept: HEALTH INFORMATION MANAGEMENT | Facility: OTHER | Age: 71
End: 2021-10-21

## 2021-10-21 NOTE — TELEPHONE ENCOUNTER
Called to offer FIT TEST, Patient agrees to have FIT TEST sent to his home address. Patient has no other questions or concerns. Attempt to discuss ROSIO and PT did not have time. Jewels cardenas was previously sent

## 2021-11-12 ENCOUNTER — NON-PROVIDER VISIT (OUTPATIENT)
Dept: MEDICAL GROUP | Facility: IMAGING CENTER | Age: 71
End: 2021-11-12
Payer: MEDICARE

## 2021-11-12 DIAGNOSIS — Z23 NEED FOR VACCINATION: ICD-10-CM

## 2021-11-12 PROCEDURE — G0008 ADMIN INFLUENZA VIRUS VAC: HCPCS | Performed by: FAMILY MEDICINE

## 2021-11-12 PROCEDURE — 90662 IIV NO PRSV INCREASED AG IM: CPT | Performed by: FAMILY MEDICINE

## 2021-11-12 NOTE — PROGRESS NOTES
"Martha Hernandez is a 71 y.o. female here for a non-provider visit for:   FLU    Reason for immunization: Annual Flu Vaccine  Immunization records indicate need for vaccine: Yes, confirmed with Epic  Minimum interval has been met for this vaccine: Yes  ABN completed: Not Indicated    VIS Dated  08/06/21 was given to patient: Yes  All IAC Questionnaire questions were answered \"No.\"    Patient tolerated injection and no adverse effects were observed or reported: Yes    Pt scheduled for next dose in series: Not Indicated  "

## 2022-02-23 ENCOUNTER — PATIENT MESSAGE (OUTPATIENT)
Dept: HEALTH INFORMATION MANAGEMENT | Facility: OTHER | Age: 72
End: 2022-02-23
Payer: MEDICARE

## 2022-07-15 ENCOUNTER — TELEPHONE (OUTPATIENT)
Dept: HEALTH INFORMATION MANAGEMENT | Facility: OTHER | Age: 72
End: 2022-07-15
Payer: MEDICARE

## 2022-07-18 PROBLEM — R73.9 HYPERGLYCEMIA: Status: ACTIVE | Noted: 2022-07-18

## 2022-07-18 PROBLEM — E66.09 CLASS 2 OBESITY DUE TO EXCESS CALORIES WITHOUT SERIOUS COMORBIDITY WITH BODY MASS INDEX (BMI) OF 38.0 TO 38.9 IN ADULT: Status: ACTIVE | Noted: 2022-07-18

## 2022-07-18 PROBLEM — M17.32 POST-TRAUMATIC OSTEOARTHRITIS OF LEFT KNEE: Status: ACTIVE | Noted: 2022-07-18

## 2022-07-18 PROBLEM — R03.0 ELEVATED BLOOD PRESSURE READING IN OFFICE WITHOUT DIAGNOSIS OF HYPERTENSION: Status: ACTIVE | Noted: 2022-07-18

## 2022-07-18 PROBLEM — E66.812 CLASS 2 OBESITY DUE TO EXCESS CALORIES WITHOUT SERIOUS COMORBIDITY WITH BODY MASS INDEX (BMI) OF 38.0 TO 38.9 IN ADULT: Status: ACTIVE | Noted: 2022-07-18

## 2022-07-19 ENCOUNTER — OFFICE VISIT (OUTPATIENT)
Dept: MEDICAL GROUP | Facility: IMAGING CENTER | Age: 72
End: 2022-07-19
Payer: MEDICARE

## 2022-07-19 VITALS
HEART RATE: 74 BPM | OXYGEN SATURATION: 95 % | TEMPERATURE: 98.4 F | DIASTOLIC BLOOD PRESSURE: 74 MMHG | HEIGHT: 64 IN | WEIGHT: 216 LBS | SYSTOLIC BLOOD PRESSURE: 126 MMHG | BODY MASS INDEX: 36.88 KG/M2

## 2022-07-19 DIAGNOSIS — Z13.0 SCREENING FOR DEFICIENCY ANEMIA: ICD-10-CM

## 2022-07-19 DIAGNOSIS — G89.29 CHRONIC PAIN OF LEFT KNEE: ICD-10-CM

## 2022-07-19 DIAGNOSIS — Z12.31 ENCOUNTER FOR SCREENING MAMMOGRAM FOR MALIGNANT NEOPLASM OF BREAST: ICD-10-CM

## 2022-07-19 DIAGNOSIS — R92.8 ABNORMAL MAMMOGRAM: ICD-10-CM

## 2022-07-19 DIAGNOSIS — M25.362 KNEE INSTABILITY, LEFT: ICD-10-CM

## 2022-07-19 DIAGNOSIS — L65.9 HAIR LOSS: ICD-10-CM

## 2022-07-19 DIAGNOSIS — M25.562 CHRONIC PAIN OF LEFT KNEE: ICD-10-CM

## 2022-07-19 DIAGNOSIS — E78.2 MIXED HYPERLIPIDEMIA: ICD-10-CM

## 2022-07-19 DIAGNOSIS — R73.9 HYPERGLYCEMIA: ICD-10-CM

## 2022-07-19 PROCEDURE — 99214 OFFICE O/P EST MOD 30 MIN: CPT | Performed by: FAMILY MEDICINE

## 2022-07-19 RX ORDER — ATORVASTATIN CALCIUM 10 MG/1
10 TABLET, FILM COATED ORAL
Qty: 100 TABLET | Refills: 1 | Status: SHIPPED | OUTPATIENT
Start: 2022-07-19 | End: 2023-06-07 | Stop reason: SDUPTHER

## 2022-07-19 ASSESSMENT — ANXIETY QUESTIONNAIRES
GAD7 TOTAL SCORE: 0
3. WORRYING TOO MUCH ABOUT DIFFERENT THINGS: NOT AT ALL
4. TROUBLE RELAXING: NOT AT ALL
6. BECOMING EASILY ANNOYED OR IRRITABLE: NOT AT ALL
1. FEELING NERVOUS, ANXIOUS, OR ON EDGE: NOT AT ALL
2. NOT BEING ABLE TO STOP OR CONTROL WORRYING: NOT AT ALL
5. BEING SO RESTLESS THAT IT IS HARD TO SIT STILL: NOT AT ALL
7. FEELING AFRAID AS IF SOMETHING AWFUL MIGHT HAPPEN: NOT AT ALL

## 2022-07-19 ASSESSMENT — PATIENT HEALTH QUESTIONNAIRE - PHQ9: CLINICAL INTERPRETATION OF PHQ2 SCORE: 0

## 2022-07-19 ASSESSMENT — PAIN SCALES - GENERAL: PAINLEVEL: 4=SLIGHT-MODERATE PAIN

## 2022-07-19 NOTE — PROGRESS NOTES
Chief Complaint   Patient presents with   • Follow-Up   • Knee Pain     Left knee   • Hair Loss   • Requesting Labs     mammogram     HPI:   With a history of   Patient Active Problem List   Diagnosis   • Mild intermittent asthma without complication   • Mixed hyperlipidemia   • Skin cancer screening   • Psoriasis   • Vertigo   • Post-traumatic osteoarthritis of left knee   • Hyperglycemia   • Class 2 obesity due to excess calories without serious comorbidity with body mass index (BMI) of 38.0 to 38.9 in adult   • Elevated blood pressure reading in office without diagnosis of hypertension     Knee pain left. Xray last year w patellofemoral and medial femoral-tibial compartment narrowing. Walking has become very difficult to for example go to the grocery store. It aches after the pain is over the patella at the front of the knee. No radiating pain. If she does push to hard the hamstrings get too tight.     Hair loss, the last 8 months her hair has become very thin. Mom also had thinning of her hair though in her 60s. Patient does not have pain, bald spots, itching, or rash of the scalp. No associated symptoms.     Had abnormal mammogram of the left, with recommended repeat US end of year 2021. Patient did miss that follow up. She is now due for mammogram screening as well.     Allergies   Allergen Reactions   • Ampicillin Hives   • Penicillins      Current Outpatient Medications   Medication Sig Dispense Refill   • atorvastatin (LIPITOR) 10 MG Tab Take 1 Tablet by mouth at bedtime. 100 Tablet 1   • coenzyme Q-10 30 MG capsule Take 60 mg by mouth every day.     • albuterol 108 (90 Base) MCG/ACT Aero Soln inhalation aerosol Inhale 2 Puffs every four hours as needed for Shortness of Breath. 1 Each 3   • Krill Oil 500 MG Cap      • therapeutic multivitamin-minerals (THERAGRAN-M) Tab Take 1 Tab by mouth every day.       No current facility-administered medications for this visit.     Social History     Tobacco Use   •  "Smoking status: Never Smoker   • Smokeless tobacco: Never Used   Vaping Use   • Vaping Use: Never used   Substance Use Topics   • Alcohol use: Not Currently     Comment: 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   • Drug use: Never     History reviewed. No pertinent family history.    /74 (BP Location: Left arm, Patient Position: Sitting, BP Cuff Size: Adult)   Pulse 74   Temp 36.9 °C (98.4 °F) (Temporal)   Ht 1.626 m (5' 4\")   Wt 98 kg (216 lb)   SpO2 95%  Body mass index is 37.08 kg/m².  Review of Systems   Constitutional: Negative for chills, fever and malaise/fatigue.   HENT: Negative for hearing loss and tinnitus.    Eyes: Negative for double vision and pain.   Respiratory: Negative for cough, shortness of breath and wheezing.    Cardiovascular: Negative for chest pain, palpitations and leg swelling.   Gastrointestinal: Negative for abdominal pain, diarrhea, nausea and vomiting.   Genitourinary: Negative for dysuria and frequency.   Musculoskeletal: Negative for joint pain and myalgias.   Skin: Negative for itching and rash.   Neurological: Negative for dizziness and headaches.     Physical Exam  Constitutional:       General: She is not in acute distress.     Appearance: She is not diaphoretic.   HENT:      Head: Normocephalic and atraumatic.      Comments: Scalp without scaring, erythema, scaling, or rash. Hair has evening thinned out where scalp is exposed. No clumping hair loss. Hair pull test positive.   Eyes:      General: No scleral icterus.        Right eye: No discharge.         Left eye: No discharge.      Conjunctiva/sclera: Conjunctivae normal.      Pupils: Pupils are equal, round, and reactive to light.   Neck:      Thyroid: No thyromegaly.   Pulmonary:      Effort: Pulmonary effort is normal. No respiratory distress.   Abdominal:      General: There is no distension.   Musculoskeletal:      Comments: mcmurrays test positive on left. Pivot shift positive left. Valgus and varus stress " positive on left. Posterior/ant drawer sign negative b/l. No crepitus b/l. No popping b/. Point tenderness posterior knee, lateral and medial left knee. No swelling of the knees b/l.    Skin:     General: Skin is warm and dry.   Neurological:      Mental Status: She is alert.   Psychiatric:         Mood and Affect: Affect normal.         Judgment: Judgment normal.             All labs (last 1 month):   No visits with results within 1 Month(s) from this visit.   Latest known visit with results is:   Hospital Outpatient Visit on 07/30/2021   Component Date Value Ref Range Status   • Occult Blood, IA 07/30/2021 Negative   Final    Comment: INTERPRETIVE INFORMATION: Fecal Occult Blood by Immunoassay  No single cutoff provides superior colorectal cancer detection  rates. The test  recommends the use of a 100 ng/mL  cutoff that produces a specificity of approximately 95 percent for  the detection of lower gastrointestinal bleeding. This test does  not detect upper gastrointestinal bleeding.  Performed By: [facility]  [address] [place] [id number]  : Melanie Adamson MD         Lipids:   Lab Results   Component Value Date/Time    CHOLSTRLTOT 175 07/28/2021 07:06 AM    TRIGLYCERIDE 136 07/28/2021 07:06 AM    HDL 51 07/28/2021 07:06 AM    LDL 97 07/28/2021 07:06 AM       Imaging: No results found.    A/P  Insurance will not cover vitamin d labs. I have removed this. rto to discuss treatment of hair loss after labs done. Patient is also due for annual exam.   Return for annual.    Problem List Items Addressed This Visit     Mixed hyperlipidemia    Relevant Medications    atorvastatin (LIPITOR) 10 MG Tab    Other Relevant Orders    Lipid Profile    Hyperglycemia    Relevant Orders    Comp Metabolic Panel    HEMOGLOBIN A1C      Other Visit Diagnoses     Hair loss        Relevant Orders    IRON    TSH WITH REFLEX TO FT4    Zinc, Whole Blood    Encounter for screening mammogram  for malignant neoplasm of breast        Relevant Orders    MA-SCREENING MAMMO BILAT W/TOMOSYNTHESIS W/CAD    Screening for deficiency anemia        Relevant Orders    CBC WITH DIFFERENTIAL    Chronic pain of left knee        Relevant Orders    Referral to Orthopedics    Knee instability, left        Relevant Orders    MR-KNEE-W/O LEFT    Abnormal mammogram        Relevant Orders    US-BREAST LIMITED-RIGHT          Portions of this note may be dictated using Dragon NaturallySpeaCardia voice recognition software.  Variances in spelling and vocabulary are possible and unintentional.  Not all areas may be caught/corrected.  Please notify me if any discrepancies are noted or if the meaning of any statement is not correct/clear.

## 2022-07-22 ENCOUNTER — HOSPITAL ENCOUNTER (OUTPATIENT)
Dept: LAB | Facility: MEDICAL CENTER | Age: 72
End: 2022-07-22
Attending: FAMILY MEDICINE
Payer: MEDICARE

## 2022-07-22 ENCOUNTER — APPOINTMENT (OUTPATIENT)
Dept: RADIOLOGY | Facility: MEDICAL CENTER | Age: 72
End: 2022-07-22
Attending: FAMILY MEDICINE
Payer: MEDICARE

## 2022-07-22 DIAGNOSIS — L65.9 HAIR LOSS: ICD-10-CM

## 2022-07-22 DIAGNOSIS — Z13.0 SCREENING FOR DEFICIENCY ANEMIA: ICD-10-CM

## 2022-07-22 DIAGNOSIS — E78.2 MIXED HYPERLIPIDEMIA: ICD-10-CM

## 2022-07-22 DIAGNOSIS — R73.9 HYPERGLYCEMIA: ICD-10-CM

## 2022-07-22 DIAGNOSIS — M25.362 KNEE INSTABILITY, LEFT: ICD-10-CM

## 2022-07-22 LAB
ALBUMIN SERPL BCP-MCNC: 4.1 G/DL (ref 3.2–4.9)
ALBUMIN/GLOB SERPL: 1.6 G/DL
ALP SERPL-CCNC: 94 U/L (ref 30–99)
ALT SERPL-CCNC: 10 U/L (ref 2–50)
ANION GAP SERPL CALC-SCNC: 11 MMOL/L (ref 7–16)
AST SERPL-CCNC: 10 U/L (ref 12–45)
BASOPHILS # BLD AUTO: 0.8 % (ref 0–1.8)
BASOPHILS # BLD: 0.05 K/UL (ref 0–0.12)
BILIRUB SERPL-MCNC: 0.6 MG/DL (ref 0.1–1.5)
BUN SERPL-MCNC: 17 MG/DL (ref 8–22)
CALCIUM SERPL-MCNC: 9.8 MG/DL (ref 8.5–10.5)
CHLORIDE SERPL-SCNC: 105 MMOL/L (ref 96–112)
CHOLEST SERPL-MCNC: 175 MG/DL (ref 100–199)
CO2 SERPL-SCNC: 23 MMOL/L (ref 20–33)
CREAT SERPL-MCNC: 0.78 MG/DL (ref 0.5–1.4)
EOSINOPHIL # BLD AUTO: 0.16 K/UL (ref 0–0.51)
EOSINOPHIL NFR BLD: 2.7 % (ref 0–6.9)
ERYTHROCYTE [DISTWIDTH] IN BLOOD BY AUTOMATED COUNT: 44.8 FL (ref 35.9–50)
EST. AVERAGE GLUCOSE BLD GHB EST-MCNC: 111 MG/DL
GFR SERPLBLD CREATININE-BSD FMLA CKD-EPI: 81 ML/MIN/1.73 M 2
GLOBULIN SER CALC-MCNC: 2.6 G/DL (ref 1.9–3.5)
GLUCOSE SERPL-MCNC: 106 MG/DL (ref 65–99)
HBA1C MFR BLD: 5.5 % (ref 4–5.6)
HCT VFR BLD AUTO: 42.6 % (ref 37–47)
HDLC SERPL-MCNC: 51 MG/DL
HGB BLD-MCNC: 14.3 G/DL (ref 12–16)
IMM GRANULOCYTES # BLD AUTO: 0.01 K/UL (ref 0–0.11)
IMM GRANULOCYTES NFR BLD AUTO: 0.2 % (ref 0–0.9)
IRON SERPL-MCNC: 65 UG/DL (ref 40–170)
LDLC SERPL CALC-MCNC: 99 MG/DL
LYMPHOCYTES # BLD AUTO: 1.5 K/UL (ref 1–4.8)
LYMPHOCYTES NFR BLD: 24.9 % (ref 22–41)
MCH RBC QN AUTO: 30.6 PG (ref 27–33)
MCHC RBC AUTO-ENTMCNC: 33.6 G/DL (ref 33.6–35)
MCV RBC AUTO: 91 FL (ref 81.4–97.8)
MONOCYTES # BLD AUTO: 0.47 K/UL (ref 0–0.85)
MONOCYTES NFR BLD AUTO: 7.8 % (ref 0–13.4)
NEUTROPHILS # BLD AUTO: 3.83 K/UL (ref 2–7.15)
NEUTROPHILS NFR BLD: 63.6 % (ref 44–72)
NRBC # BLD AUTO: 0 K/UL
NRBC BLD-RTO: 0 /100 WBC
PLATELET # BLD AUTO: 277 K/UL (ref 164–446)
PMV BLD AUTO: 11.1 FL (ref 9–12.9)
POTASSIUM SERPL-SCNC: 4.4 MMOL/L (ref 3.6–5.5)
PROT SERPL-MCNC: 6.7 G/DL (ref 6–8.2)
RBC # BLD AUTO: 4.68 M/UL (ref 4.2–5.4)
SODIUM SERPL-SCNC: 139 MMOL/L (ref 135–145)
TRIGL SERPL-MCNC: 127 MG/DL (ref 0–149)
TSH SERPL DL<=0.005 MIU/L-ACNC: 2.48 UIU/ML (ref 0.38–5.33)
WBC # BLD AUTO: 6 K/UL (ref 4.8–10.8)

## 2022-07-22 PROCEDURE — 80061 LIPID PANEL: CPT

## 2022-07-22 PROCEDURE — 73721 MRI JNT OF LWR EXTRE W/O DYE: CPT | Mod: LT,ME

## 2022-07-22 PROCEDURE — 83036 HEMOGLOBIN GLYCOSYLATED A1C: CPT

## 2022-07-22 PROCEDURE — 85025 COMPLETE CBC W/AUTO DIFF WBC: CPT

## 2022-07-22 PROCEDURE — 36415 COLL VENOUS BLD VENIPUNCTURE: CPT

## 2022-07-22 PROCEDURE — 84443 ASSAY THYROID STIM HORMONE: CPT

## 2022-07-22 PROCEDURE — 83540 ASSAY OF IRON: CPT

## 2022-07-22 PROCEDURE — 80053 COMPREHEN METABOLIC PANEL: CPT

## 2022-07-22 PROCEDURE — 84630 ASSAY OF ZINC: CPT

## 2022-07-25 LAB — ZINC BLD-MCNC: 604.8 UG/DL (ref 440–860)

## 2022-08-11 ENCOUNTER — APPOINTMENT (OUTPATIENT)
Dept: RADIOLOGY | Facility: MEDICAL CENTER | Age: 72
End: 2022-08-11
Attending: FAMILY MEDICINE
Payer: MEDICARE

## 2022-09-15 SDOH — HEALTH STABILITY: PHYSICAL HEALTH

## 2022-09-15 SDOH — HEALTH STABILITY: PHYSICAL HEALTH: ON AVERAGE, HOW MANY DAYS PER WEEK DO YOU ENGAGE IN MODERATE TO STRENUOUS EXERCISE (LIKE A BRISK WALK)?: 0 DAYS

## 2022-09-15 SDOH — ECONOMIC STABILITY: FOOD INSECURITY: WITHIN THE PAST 12 MONTHS, THE FOOD YOU BOUGHT JUST DIDN'T LAST AND YOU DIDN'T HAVE MONEY TO GET MORE.: NEVER TRUE

## 2022-09-15 SDOH — ECONOMIC STABILITY: INCOME INSECURITY: IN THE LAST 12 MONTHS, WAS THERE A TIME WHEN YOU WERE NOT ABLE TO PAY THE MORTGAGE OR RENT ON TIME?: NO

## 2022-09-15 SDOH — ECONOMIC STABILITY: FOOD INSECURITY: WITHIN THE PAST 12 MONTHS, YOU WORRIED THAT YOUR FOOD WOULD RUN OUT BEFORE YOU GOT MONEY TO BUY MORE.: NEVER TRUE

## 2022-09-15 SDOH — ECONOMIC STABILITY: HOUSING INSECURITY

## 2022-09-15 SDOH — ECONOMIC STABILITY: INCOME INSECURITY: HOW HARD IS IT FOR YOU TO PAY FOR THE VERY BASICS LIKE FOOD, HOUSING, MEDICAL CARE, AND HEATING?: NOT VERY HARD

## 2022-09-15 ASSESSMENT — SOCIAL DETERMINANTS OF HEALTH (SDOH)
IN A TYPICAL WEEK, HOW MANY TIMES DO YOU TALK ON THE PHONE WITH FAMILY, FRIENDS, OR NEIGHBORS?: ONCE A WEEK
HOW OFTEN DO YOU GET TOGETHER WITH FRIENDS OR RELATIVES?: NEVER
HOW OFTEN DO YOU ATTENT MEETINGS OF THE CLUB OR ORGANIZATION YOU BELONG TO?: NEVER
DO YOU BELONG TO ANY CLUBS OR ORGANIZATIONS SUCH AS CHURCH GROUPS UNIONS, FRATERNAL OR ATHLETIC GROUPS, OR SCHOOL GROUPS?: NO
HOW OFTEN DO YOU GET TOGETHER WITH FRIENDS OR RELATIVES?: NEVER
DO YOU BELONG TO ANY CLUBS OR ORGANIZATIONS SUCH AS CHURCH GROUPS UNIONS, FRATERNAL OR ATHLETIC GROUPS, OR SCHOOL GROUPS?: NO
HOW OFTEN DO YOU ATTENT MEETINGS OF THE CLUB OR ORGANIZATION YOU BELONG TO?: NEVER
WITHIN THE PAST 12 MONTHS, YOU WORRIED THAT YOUR FOOD WOULD RUN OUT BEFORE YOU GOT THE MONEY TO BUY MORE: NEVER TRUE
IN A TYPICAL WEEK, HOW MANY TIMES DO YOU TALK ON THE PHONE WITH FAMILY, FRIENDS, OR NEIGHBORS?: ONCE A WEEK
HOW OFTEN DO YOU ATTEND CHURCH OR RELIGIOUS SERVICES?: NEVER
HOW OFTEN DO YOU ATTEND CHURCH OR RELIGIOUS SERVICES?: NEVER
HOW OFTEN DO YOU HAVE A DRINK CONTAINING ALCOHOL: NEVER
HOW HARD IS IT FOR YOU TO PAY FOR THE VERY BASICS LIKE FOOD, HOUSING, MEDICAL CARE, AND HEATING?: NOT VERY HARD

## 2022-09-15 ASSESSMENT — LIFESTYLE VARIABLES: HOW OFTEN DO YOU HAVE A DRINK CONTAINING ALCOHOL: NEVER

## 2022-09-18 NOTE — PROGRESS NOTES
Chief Complaint   Patient presents with    Establish Care       HISTORY OF THE PRESENT ILLNESS: Patient is a 72 y.o. female. This pleasant patient is here today to establish care and to discuss:    To establish care  Previous PCP Dr. Pope  Patient reports of being up to date of [facility] guard , completed 6 months ago with normal results.  Mammogram up to date    Mixed hyperlipidemia  Established. On lipitor 10 mg. The 10-year ASCVD risk score (Husseinjohnny FARR Jr., et al., 2013) is: 10.8%  Patient denies muscle pain, fatigue, lethargy, loss of appetite, abdominal pain, dark-colored urine, or yellowing of skin or eyes.      Latest Reference Range & Units 7/22/22 08:29   Cholesterol,Tot 100 - 199 mg/dL 175   Triglycerides 0 - 149 mg/dL 127   HDL >=40 mg/dL 51   LDL <100 mg/dL 99       OA left knee  Requesting electric scooter-DME  Established issue. Managed by the [facility], Dr. Mane s/p cortisone injection and will start PT. Patient is scheduled to have left total knee arthroplasty on 12/12/2022. Patient is having difficulty walking prolonged distance as this exacerbate her pain. Patient's BMI contributes to this condition. However, patient unable to exercise and is limited on physical activity due to her left knee pain. Patient also reports due to her personal history of asthma, she becomes short of breath walking long distance. She does use her inhaler as needed.   Patient could benefit from electric scooter.     Asthma  Established issue. Controlled. Physical activity such as walking long distance induces her symptoms. Takes inhaler as needed which is helpful. No recent flare up.  No wheezing, coughing, shortness of breath, difficulty breathing chest tightness, chest pain, palpitations, or dizziness.    Allergies: Ampicillin and Penicillins    Current Outpatient Medications Ordered in Epic   Medication Sig Dispense Refill    albuterol 108 (90 Base) MCG/ACT Aero Soln inhalation aerosol INHALE 2 PUFFS BY MOUTH EVERY 4 HOURS AS  "NEEDED FOR SHORTNESS OF BREATH 8.5 g 0    atorvastatin (LIPITOR) 10 MG Tab Take 1 Tablet by mouth at bedtime. 100 Tablet 1    coenzyme Q-10 30 MG capsule Take 60 mg by mouth every day.      Krill Oil 500 MG Cap       therapeutic multivitamin-minerals (THERAGRAN-M) Tab Take 1 Tab by mouth every day.      meloxicam (MOBIC) 15 MG tablet Take 1 Tablet by mouth every day. (Patient not taking: Reported on 9/19/2022) 30 Tablet 0     No current Epic-ordered facility-administered medications on file.       No past medical history on file.    No past surgical history on file.    Social History     Tobacco Use    Smoking status: Never    Smokeless tobacco: Never   Vaping Use    Vaping Use: Never used   Substance Use Topics    Alcohol use: Not Currently     Comment: 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    Drug use: Never       Social History     Social History Narrative    Not on file       No family history on file.    ROS: per hpi    Gen: no fevers/chills  Pulm: no sob, no cough  CV: no chest pain, no palpitations, no edema  GI: no nausea/vomiting, no diarrhea  Skin: no rash    Exam: /78 (BP Location: Left arm, Patient Position: Sitting, BP Cuff Size: Adult)   Pulse 96   Temp 36.6 °C (97.9 °F) (Temporal)   Resp 14   Ht 1.6 m (5' 3\")   Wt 97.9 kg (215 lb 12.8 oz)   SpO2 95%  Body mass index is 38.23 kg/m².    General: Normal appearing. No distress.  HEENT: Normocephalic. Eyes conjunctiva clear lids without ptosis, ears normal shape and contour,nasal mucosa benign, oropharynx is without erythema, edema or exudates.   Neck: Supple. Thyroid is not enlarged.  Pulmonary: Clear to ausculation.  Normal effort. No rales, ronchi, or wheezing.  Cardiovascular: Regular rate and rhythm without murmur. Carotid and radial pulses are intact and equal bilaterally.  Abdomen: Soft, nontender, nondistended. Normal bowel sounds.  Neurologic: Grossly nonfocal  Lymph: No cervical, supraclavicular lymph nodes are palpable  Skin: " Warm and dry.  No obvious lesions.  Musculoskeletal: Normal gait. No extremity cyanosis, clubbing, or edema.  Psych: Normal mood and affect. Alert and oriented x3. Judgment and insight is normal.      Please note that this dictation was created using voice recognition software. I have made every reasonable attempt to correct obvious errors, but I expect that there are errors of grammar and possibly content that I did not discover before finalizing the note.      Assessment/Plan    72 y.o. female with the following -    1. Encounter to establish care    2. Class 2 obesity due to excess calories without serious comorbidity with body mass index (BMI) of 38.0 to 38.9 in adult  Discussed lifestyle and dietary changes such as low-carb diet, high in vegetables and fresh fruits.  Encouraged to increase water intake.  Regular physical exercise at least 30 minutes/day 5 days a week. Weight reduction if applicable (aim for 5% to 10% body weight increments). Patient is at an increased risk for serious conditions such as heart disease, type 2 diabetes, TAYLOR, certain types of cancers, gallbladder disease, and circulation problems.     3. Post-traumatic osteoarthritis of left knee  Chronic condition.  Patient managed by [facility] Otero s/p cortisone injection.  Patient is scheduled to start physical therapy.  Patient will be undergoing left total knee arthroplasty on 12/12/2022. Patient reports to having difficulty with prolonged or long distance walking and participate in mobility related activities of daily living like grocery shopping as it exacerbates her pain. These activities also triggers her asthma symptoms. Patient would benefit from an electric scooter    4. Mild intermittent asthma without complication  Chronic and controlled condition. Recommend to avoid known triggers. Advised to use inhaler as instructed. ED symptoms discussed.    5. Mixed hyperlipidemia  Chronic and stable condition. Refill sent to pharmacy. Discussed  medication desired effects, potential side effects, and hot to administer. Advised patient to stop medication for symptoms of muscle pain, fatigue, lethargy, loss of appetite, abdominal pain, dark-colored urine, or yellowing of skin or eyes.  Nonpharmacological interventions such as low-salt, cardiac diet discussed.  Educated on stress reduction and physical activity. Recommend for patient to start Omega-3 fatty acid 1g/day, Red yeast rice 2.4g/day, or berberine 900-1500 mg/day, and probiotics daily supplementation.    Discussed signs and symptoms of major cardiovascular event and need to present to ED.    6. Need for vaccination    - INFLUENZA VACCINE, HIGH DOSE (65+ ONLY)    Medical Decision Making/Course:  In the course of preparing for this visit with review of the pertinent past medical history, recent and past clinic visits, current medications, and performing chart, immunization, medical history and medication reconciliation, and in the further course of obtaining the current history pertinent to the clinic visit today, performing an exam and evaluation, ordering and independently evaluating labs, tests, and/or procedures, prescribing any recommended new medications as noted above, providing any pertinent counseling and education and recommending further coordination of care. This was discussed with patient in a shared-decision making conversation, and they understand and agreed with plan of care.     Return in about 1 year (around 9/19/2023), or if symptoms worsen or fail to improve, for annual.    Thank you, Marcy BAILEY  [facility]      Please note that this dictation was created using voice recognition software. I have made every reasonable attempt to correct obvious errors, but I expect that there are errors of grammar and possibly content that I did not discover before finalizing the note.

## 2022-09-19 ENCOUNTER — OFFICE VISIT (OUTPATIENT)
Dept: MEDICAL GROUP | Facility: IMAGING CENTER | Age: 72
End: 2022-09-19
Payer: MEDICARE

## 2022-09-19 VITALS
TEMPERATURE: 97.9 F | HEIGHT: 63 IN | OXYGEN SATURATION: 95 % | BODY MASS INDEX: 38.24 KG/M2 | HEART RATE: 96 BPM | SYSTOLIC BLOOD PRESSURE: 124 MMHG | WEIGHT: 215.8 LBS | DIASTOLIC BLOOD PRESSURE: 78 MMHG | RESPIRATION RATE: 14 BRPM

## 2022-09-19 DIAGNOSIS — E66.09 CLASS 2 OBESITY DUE TO EXCESS CALORIES WITHOUT SERIOUS COMORBIDITY WITH BODY MASS INDEX (BMI) OF 38.0 TO 38.9 IN ADULT: ICD-10-CM

## 2022-09-19 DIAGNOSIS — E78.2 MIXED HYPERLIPIDEMIA: Primary | ICD-10-CM

## 2022-09-19 DIAGNOSIS — M17.32 POST-TRAUMATIC OSTEOARTHRITIS OF LEFT KNEE: ICD-10-CM

## 2022-09-19 DIAGNOSIS — Z23 NEED FOR VACCINATION: ICD-10-CM

## 2022-09-19 DIAGNOSIS — Z76.89 ENCOUNTER TO ESTABLISH CARE: ICD-10-CM

## 2022-09-19 DIAGNOSIS — J45.20 MILD INTERMITTENT ASTHMA WITHOUT COMPLICATION: ICD-10-CM

## 2022-09-19 PROCEDURE — G0008 ADMIN INFLUENZA VIRUS VAC: HCPCS

## 2022-09-19 PROCEDURE — 90662 IIV NO PRSV INCREASED AG IM: CPT

## 2022-09-19 PROCEDURE — 99213 OFFICE O/P EST LOW 20 MIN: CPT | Mod: 25

## 2022-09-19 ASSESSMENT — FIBROSIS 4 INDEX: FIB4 SCORE: 0.82

## 2022-09-19 ASSESSMENT — PAIN SCALES - GENERAL: PAINLEVEL: NO PAIN

## 2022-12-06 ENCOUNTER — HOSPITAL ENCOUNTER (OUTPATIENT)
Dept: LAB | Facility: MEDICAL CENTER | Age: 72
End: 2022-12-06
Attending: ORTHOPAEDIC SURGERY
Payer: MEDICARE

## 2022-12-06 ENCOUNTER — NON-PROVIDER VISIT (OUTPATIENT)
Dept: MEDICAL GROUP | Facility: IMAGING CENTER | Age: 72
End: 2022-12-06
Payer: MEDICARE

## 2022-12-06 DIAGNOSIS — M17.12 ARTHRITIS OF LEFT KNEE: ICD-10-CM

## 2022-12-06 DIAGNOSIS — Z01.818 PREOPERATIVE TESTING: ICD-10-CM

## 2022-12-06 LAB
ANION GAP SERPL CALC-SCNC: 11 MMOL/L (ref 7–16)
APPEARANCE UR: ABNORMAL
APTT PPP: 29.9 SEC (ref 24.7–36)
BACTERIA #/AREA URNS HPF: ABNORMAL /HPF
BASOPHILS # BLD AUTO: 0.6 % (ref 0–1.8)
BASOPHILS # BLD: 0.04 K/UL (ref 0–0.12)
BILIRUB UR QL STRIP.AUTO: NEGATIVE
BUN SERPL-MCNC: 13 MG/DL (ref 8–22)
CALCIUM SERPL-MCNC: 9.6 MG/DL (ref 8.5–10.5)
CHLORIDE SERPL-SCNC: 104 MMOL/L (ref 96–112)
CO2 SERPL-SCNC: 25 MMOL/L (ref 20–33)
COLOR UR: YELLOW
CREAT SERPL-MCNC: 0.68 MG/DL (ref 0.5–1.4)
EOSINOPHIL # BLD AUTO: 0.27 K/UL (ref 0–0.51)
EOSINOPHIL NFR BLD: 4 % (ref 0–6.9)
EPI CELLS #/AREA URNS HPF: ABNORMAL /HPF
ERYTHROCYTE [DISTWIDTH] IN BLOOD BY AUTOMATED COUNT: 45.5 FL (ref 35.9–50)
EXTERNAL QUALITY CONTROL: NORMAL
GFR SERPLBLD CREATININE-BSD FMLA CKD-EPI: 92 ML/MIN/1.73 M 2
GLUCOSE SERPL-MCNC: 129 MG/DL (ref 65–99)
GLUCOSE UR STRIP.AUTO-MCNC: NEGATIVE MG/DL
HCT VFR BLD AUTO: 43.2 % (ref 37–47)
HGB BLD-MCNC: 14.4 G/DL (ref 12–16)
HIV 1+2 AB+HIV1 P24 AG SERPL QL IA: NORMAL
HYALINE CASTS #/AREA URNS LPF: ABNORMAL /LPF
IMM GRANULOCYTES # BLD AUTO: 0.02 K/UL (ref 0–0.11)
IMM GRANULOCYTES NFR BLD AUTO: 0.3 % (ref 0–0.9)
INR PPP: 0.98 (ref 0.87–1.13)
INT CON NEG: NORMAL
INT CON POS: NORMAL
KETONES UR STRIP.AUTO-MCNC: NEGATIVE MG/DL
LEUKOCYTE ESTERASE UR QL STRIP.AUTO: ABNORMAL
LYMPHOCYTES # BLD AUTO: 2.08 K/UL (ref 1–4.8)
LYMPHOCYTES NFR BLD: 31.2 % (ref 22–41)
MCH RBC QN AUTO: 30.4 PG (ref 27–33)
MCHC RBC AUTO-ENTMCNC: 33.3 G/DL (ref 33.6–35)
MCV RBC AUTO: 91.3 FL (ref 81.4–97.8)
MICRO URNS: ABNORMAL
MONOCYTES # BLD AUTO: 0.44 K/UL (ref 0–0.85)
MONOCYTES NFR BLD AUTO: 6.6 % (ref 0–13.4)
MUCOUS THREADS #/AREA URNS HPF: ABNORMAL /HPF
NEUTROPHILS # BLD AUTO: 3.82 K/UL (ref 2–7.15)
NEUTROPHILS NFR BLD: 57.3 % (ref 44–72)
NITRITE UR QL STRIP.AUTO: NEGATIVE
NRBC # BLD AUTO: 0 K/UL
NRBC BLD-RTO: 0 /100 WBC
PH UR STRIP.AUTO: 6.5 [PH] (ref 5–8)
PLATELET # BLD AUTO: 286 K/UL (ref 164–446)
PMV BLD AUTO: 11.1 FL (ref 9–12.9)
POTASSIUM SERPL-SCNC: 4.1 MMOL/L (ref 3.6–5.5)
PROT UR QL STRIP: NEGATIVE MG/DL
PROTHROMBIN TIME: 12.9 SEC (ref 12–14.6)
RBC # BLD AUTO: 4.73 M/UL (ref 4.2–5.4)
RBC # URNS HPF: ABNORMAL /HPF
RBC UR QL AUTO: NEGATIVE
SARS-COV+SARS-COV-2 AG RESP QL IA.RAPID: NEGATIVE
SODIUM SERPL-SCNC: 140 MMOL/L (ref 135–145)
SP GR UR STRIP.AUTO: 1.02
UROBILINOGEN UR STRIP.AUTO-MCNC: 0.2 MG/DL
WBC # BLD AUTO: 6.7 K/UL (ref 4.8–10.8)
WBC #/AREA URNS HPF: ABNORMAL /HPF

## 2022-12-06 PROCEDURE — 80048 BASIC METABOLIC PNL TOTAL CA: CPT

## 2022-12-06 PROCEDURE — 81001 URINALYSIS AUTO W/SCOPE: CPT

## 2022-12-06 PROCEDURE — 85610 PROTHROMBIN TIME: CPT

## 2022-12-06 PROCEDURE — 85025 COMPLETE CBC W/AUTO DIFF WBC: CPT

## 2022-12-06 PROCEDURE — 87389 HIV-1 AG W/HIV-1&-2 AB AG IA: CPT

## 2022-12-06 PROCEDURE — 85730 THROMBOPLASTIN TIME PARTIAL: CPT

## 2022-12-06 PROCEDURE — 36415 COLL VENOUS BLD VENIPUNCTURE: CPT

## 2022-12-06 PROCEDURE — 87426 SARSCOV CORONAVIRUS AG IA: CPT | Mod: CS

## 2022-12-06 NOTE — PROGRESS NOTES
Martha Hernandez is a 72 y.o. female here for a non-provider visit for COVID testing.    Clinic collect COVID order in system?: Yes    Patient tolerated specimen collection and no adverse effects were observed or reported: Yes

## 2022-12-13 ENCOUNTER — PHARMACY VISIT (OUTPATIENT)
Dept: PHARMACY | Facility: MEDICAL CENTER | Age: 72
End: 2022-12-13
Payer: MEDICARE

## 2022-12-13 DIAGNOSIS — Z96.652 STATUS POST LEFT KNEE REPLACEMENT: ICD-10-CM

## 2022-12-13 PROCEDURE — RXMED WILLOW AMBULATORY MEDICATION CHARGE: Performed by: PHYSICIAN ASSISTANT

## 2022-12-13 RX ORDER — OXYCODONE HYDROCHLORIDE 5 MG/1
5 TABLET ORAL EVERY 4 HOURS PRN
Qty: 42 TABLET | Refills: 0 | Status: SHIPPED | OUTPATIENT
Start: 2022-12-13 | End: 2022-12-20

## 2022-12-13 RX ORDER — ASPIRIN 81 MG/1
81 TABLET ORAL 2 TIMES DAILY WITH MEALS
Qty: 90 TABLET | Refills: 0 | Status: SHIPPED | OUTPATIENT
Start: 2022-12-13 | End: 2023-06-07

## 2023-04-11 ENCOUNTER — TELEPHONE (OUTPATIENT)
Dept: HEALTH INFORMATION MANAGEMENT | Facility: OTHER | Age: 73
End: 2023-04-11
Payer: MEDICARE

## 2023-04-14 ENCOUNTER — HOSPITAL ENCOUNTER (OUTPATIENT)
Dept: RADIOLOGY | Facility: MEDICAL CENTER | Age: 73
End: 2023-04-14
Payer: MEDICARE

## 2023-04-14 ENCOUNTER — HOSPITAL ENCOUNTER (OUTPATIENT)
Dept: RADIOLOGY | Facility: MEDICAL CENTER | Age: 73
End: 2023-04-14
Attending: FAMILY MEDICINE
Payer: MEDICARE

## 2023-04-14 DIAGNOSIS — R92.8 ABNORMAL MAMMOGRAM: ICD-10-CM

## 2023-04-14 PROCEDURE — 76642 ULTRASOUND BREAST LIMITED: CPT | Mod: RT

## 2023-04-14 PROCEDURE — G0279 TOMOSYNTHESIS, MAMMO: HCPCS

## 2023-06-07 ENCOUNTER — OFFICE VISIT (OUTPATIENT)
Dept: MEDICAL GROUP | Facility: IMAGING CENTER | Age: 73
End: 2023-06-07
Payer: MEDICARE

## 2023-06-07 VITALS
WEIGHT: 200.6 LBS | SYSTOLIC BLOOD PRESSURE: 116 MMHG | HEART RATE: 73 BPM | RESPIRATION RATE: 17 BRPM | TEMPERATURE: 97.7 F | OXYGEN SATURATION: 97 % | HEIGHT: 64 IN | BODY MASS INDEX: 34.25 KG/M2 | DIASTOLIC BLOOD PRESSURE: 70 MMHG

## 2023-06-07 DIAGNOSIS — E66.9 OBESITY (BMI 30-39.9): ICD-10-CM

## 2023-06-07 DIAGNOSIS — R73.09 ELEVATED GLUCOSE: ICD-10-CM

## 2023-06-07 DIAGNOSIS — L40.9 PSORIASIS: ICD-10-CM

## 2023-06-07 DIAGNOSIS — J45.20 MILD INTERMITTENT ASTHMA WITHOUT COMPLICATION: ICD-10-CM

## 2023-06-07 DIAGNOSIS — F10.21 RECOVERING ALCOHOLIC (HCC): ICD-10-CM

## 2023-06-07 DIAGNOSIS — R92.8 ABNORMAL MAMMOGRAM OF RIGHT BREAST: ICD-10-CM

## 2023-06-07 DIAGNOSIS — E78.2 MIXED HYPERLIPIDEMIA: ICD-10-CM

## 2023-06-07 DIAGNOSIS — Z87.898 HISTORY OF VERTIGO: ICD-10-CM

## 2023-06-07 PROCEDURE — 3074F SYST BP LT 130 MM HG: CPT | Performed by: FAMILY MEDICINE

## 2023-06-07 PROCEDURE — 3078F DIAST BP <80 MM HG: CPT | Performed by: FAMILY MEDICINE

## 2023-06-07 PROCEDURE — 1126F AMNT PAIN NOTED NONE PRSNT: CPT | Performed by: FAMILY MEDICINE

## 2023-06-07 PROCEDURE — 99214 OFFICE O/P EST MOD 30 MIN: CPT | Performed by: FAMILY MEDICINE

## 2023-06-07 RX ORDER — ATORVASTATIN CALCIUM 10 MG/1
10 TABLET, FILM COATED ORAL
Qty: 100 TABLET | Refills: 0 | Status: SHIPPED | OUTPATIENT
Start: 2023-06-07 | End: 2023-08-28 | Stop reason: SDUPTHER

## 2023-06-07 ASSESSMENT — FIBROSIS 4 INDEX: FIB4 SCORE: 0.8

## 2023-06-07 ASSESSMENT — PATIENT HEALTH QUESTIONNAIRE - PHQ9: CLINICAL INTERPRETATION OF PHQ2 SCORE: 0

## 2023-06-07 ASSESSMENT — PAIN SCALES - GENERAL: PAINLEVEL: NO PAIN

## 2023-06-07 NOTE — PATIENT INSTRUCTIONS
You must fast 8 to 10 hours.  Lab orders are in the system which you can complete at any [facility].  Please follow-up for a visit after your lab work is complete.    Please call [phone] to schedule your imaging.

## 2023-06-07 NOTE — PROGRESS NOTES
Chief Complaint   Patient presents with    Establish Care     Prior PCP- Marcy Holt       HPI:  72 y.o. female new patient here to review medical issues and establish care.   Previously with Dr. Hernández and Marcy Holt, APRN.     Hyperlipidemia- Atorvastatin 10 mg daily, needs refill.     Asthma- occasional symptoms, humidity and chlorine may trigger.   Off asa and meloxicam. Had knee replacement surgery 12/2022.     Recovering alcoholic since June 1986.     Right breast- abnormal imaging, repeat u/s due for right side 10/2023.     Hx of bout of vertigo a few years ago. Occasionally a light headedness if inactive and stands up, but lately better.   Mother smoked- she had lung cancer.  Father- had heart disease.      Hx of psoriasis.     Lifestyle:  Diet: decent, likes some junk food  Exercise/Activities: not regular, beltran not like exercise  Social Support: has daughter- lives with her  Work: retired,     Health Maintenance  Last pap: hx of total hysterectomy, no significant issues.  Did not want to have more children at that time.   Immunizations: as below  Mammogram: due for repeat u/s on right in 10/2023  Colonoscopy: cologuard- 6-8 months ago per patient.  Dexa Scan: declined  Hepatitis C screening: negative    Health Maintenance Due   Topic Date Due    BONE DENSITY  Never done    COVID-19 Vaccine (4 - Pfizer series) 03/21/2022    COLORECTAL CANCER SCREENING  07/30/2022       Immunization History   Administered Date(s) Administered    Influenza Vaccine Adult HD 10/23/2020, 11/12/2021, 09/19/2022    PFIZER PURPLE CAP SARS-COV-2 VACCINATION (12+) 01/26/2021, 02/19/2021, 01/24/2022    Pneumococcal Conjugate Vaccine (Prevnar/PCV-13) 02/07/2020    Pneumococcal polysaccharide vaccine (PPSV-23) 07/20/2021    Tdap Vaccine 02/07/2020    Zoster Vaccine Recombinant (RZV) (SHINGRIX) 07/21/2021, 09/11/2021         Review of Systems   Constitutional: Negative for fever, chills and malaise/fatigue.   HENT:  Negative for congestion, sore throat, or swallowing issues.   Eyes: Negative for pain or vision changes.   Respiratory: Negative for cough and shortness of breath.    Cardiovascular: Negative for leg swelling. No chest pain.   Gastrointestinal: Negative for nausea, vomiting, abdominal pain and diarrhea.   Genitourinary: Negative for dysuria and hematuria.   Skin: Negative for rash.   Neurological: Negative for focal weakness and headaches.   Endo/Heme/Allergies: Does not bruise/bleed easily.   Psychiatric/Behavioral: Negative for depression.  The patient is not nervous/anxious.          Current Outpatient Medications:     albuterol 108 (90 Base) MCG/ACT Aero Soln inhalation aerosol, INHALE 2 PUFFS BY MOUTH EVERY 4 HOURS AS NEEDED FOR SHORTNESS OF BREATH, Disp: 8.5 g, Rfl: 0    atorvastatin (LIPITOR) 10 MG Tab, Take 1 Tablet by mouth at bedtime., Disp: 100 Tablet, Rfl: 1    coenzyme Q-10 30 MG capsule, Take 60 mg by mouth every day., Disp: , Rfl:     Krill Oil 500 MG Cap, , Disp: , Rfl:     therapeutic multivitamin-minerals (THERAGRAN-M) Tab, Take 1 Tab by mouth every day., Disp: , Rfl:     Allergies   Allergen Reactions    Ampicillin Hives    Penicillins        Patient Active Problem List   Diagnosis    Mild intermittent asthma without complication    Mixed hyperlipidemia    Skin cancer screening    Psoriasis    Vertigo    Post-traumatic osteoarthritis of left knee    Hyperglycemia    Class 2 obesity due to excess calories without serious comorbidity with body mass index (BMI) of 38.0 to 38.9 in adult    Elevated blood pressure reading in office without diagnosis of hypertension    Body mass index (BMI) 38.0-38.9, adult       Past Medical History:   Diagnosis Date    Hyperlipidemia     Psoriasis     Recovering alcoholic (HCC)     since June 1986       Past Surgical History:   Procedure Laterality Date    PB TOTAL KNEE ARTHROPLASTY Left 12/12/2022    Procedure: LEFT TOTAL KNEE ARTHROPLASTY;  Surgeon: Carlyle Mane  "M.D.;  Location[facility];  Service: Orthopedics    KNEE ARTHROSCOPY Left     VAGINAL HYSTERECTOMY TOTAL      1973       History reviewed. No pertinent family history.    Social History     Tobacco Use    Smoking status: Never    Smokeless tobacco: Never   Vaping Use    Vaping Use: Never used   Substance Use Topics    Alcohol use: Not Currently     Comment: 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    Drug use: Never       PHYSICAL EXAM:  /70 (BP Location: Left arm, Patient Position: Sitting, BP Cuff Size: Adult)   Pulse 73   Temp 36.5 °C (97.7 °F) (Temporal)   Resp 17   Ht 1.626 m (5' 4\")   Wt 91 kg (200 lb 9.6 oz)   SpO2 97%   BMI 34.43 kg/m²   Constitutional: She appears well-developed and well-nourished. She appears not diaphoretic. No distress.   HENT: Right Ear: External ear normal. Left Ear: External ear normal. Tympanic membranes clear and intact.   Nose: Nose normal.   Mouth/Throat: Oropharynx is clear and moist. No oropharyngeal exudate.     Eyes: Conjunctivae and extraocular motions are normal. Slight whitish halo around periphery of cornea bilaterally. Pupils are equal, round, and reactive to light. No scleral icterus.   Neck: Normal range of motion. Neck supple. No thyromegaly present.   Cardiovascular: Normal rate, regular rhythm, normal heart sounds and intact distal pulses.  Exam reveals no gallop and no friction rub.  No murmur heard. No carotid bruits.   Pulmonary/Chest: Effort normal and breath sounds normal. No respiratory distress. She has no wheezes. She has no rales.   Abdominal: Soft. Bowel sounds are normal. She exhibits no distension and no mass. No tenderness. She has no rebound and no guarding.   Lymphadenopathy:  She has no cervical adenopathy.   Neurological: She is alert. She has normal reflexes. No cranial nerve deficit. She exhibits normal muscle tone.   Skin: Skin is warm and dry. No rash noted. She is not diaphoretic. No erythema.   Psychiatric: " She has a normal mood and affect. Her behavior is normal.   Musculoskeletal: She exhibits no edema. Full strength throughout. 2+ DTR throughout.       ASSESSMENT/PLAN:    This is a 72 y.o. female new patient.     1. Mixed hyperlipidemia- stable. Assess labs. Continue atorvastatin 10 mg daily.   Recommend low cholesterol/high fiber diet and routine exercise.  TSH WITH REFLEX TO FT4    Comp Metabolic Panel    Lipid Profile    atorvastatin (LIPITOR) 10 MG Tab      2. Mild intermittent asthma without complication -stable, continue albuterol as needed.  VITAMIN D,25 HYDROXY (DEFICIENCY)      3. Abnormal mammogram of right breast - imaging due 10/2023.  US-BREAST LIMITED-RIGHT      4. Elevated glucose - monitor labs.  HEMOGLOBIN A1C    Comp Metabolic Panel      5. History of vertigo -stable. Monitor.        6. Obesity (BMI 30-39.9)   Patient's body mass index is 34.43 kg/m².   Reviewed diet and exercise recommendations as above. Monitor.        7. Psoriasis - assess labs.  VITAMIN D,25 HYDROXY (DEFICIENCY)        8. Recovering alcoholic (HCC) -stable, since June 1986.            Return in about 2 weeks (around 6/21/2023).      This medical record contains text that has been entered with the assistance of computer voice recognition and dictation software.  Therefore, it may contain unintended errors in text, spelling, punctuation, or grammar.

## 2023-06-09 ENCOUNTER — HOSPITAL ENCOUNTER (OUTPATIENT)
Dept: LAB | Facility: MEDICAL CENTER | Age: 73
End: 2023-06-09
Attending: FAMILY MEDICINE
Payer: MEDICARE

## 2023-06-09 DIAGNOSIS — J45.20 MILD INTERMITTENT ASTHMA WITHOUT COMPLICATION: ICD-10-CM

## 2023-06-09 DIAGNOSIS — E78.2 MIXED HYPERLIPIDEMIA: ICD-10-CM

## 2023-06-09 DIAGNOSIS — L40.9 PSORIASIS: ICD-10-CM

## 2023-06-09 DIAGNOSIS — R73.09 ELEVATED GLUCOSE: ICD-10-CM

## 2023-06-09 LAB
25(OH)D3 SERPL-MCNC: 26 NG/ML (ref 30–100)
ALBUMIN SERPL BCP-MCNC: 4 G/DL (ref 3.2–4.9)
ALBUMIN/GLOB SERPL: 1.5 G/DL
ALP SERPL-CCNC: 95 U/L (ref 30–99)
ALT SERPL-CCNC: 10 U/L (ref 2–50)
ANION GAP SERPL CALC-SCNC: 13 MMOL/L (ref 7–16)
AST SERPL-CCNC: 13 U/L (ref 12–45)
BILIRUB SERPL-MCNC: 0.6 MG/DL (ref 0.1–1.5)
BUN SERPL-MCNC: 20 MG/DL (ref 8–22)
CALCIUM ALBUM COR SERPL-MCNC: 9.8 MG/DL (ref 8.5–10.5)
CALCIUM SERPL-MCNC: 9.8 MG/DL (ref 8.5–10.5)
CHLORIDE SERPL-SCNC: 104 MMOL/L (ref 96–112)
CHOLEST SERPL-MCNC: 183 MG/DL (ref 100–199)
CO2 SERPL-SCNC: 24 MMOL/L (ref 20–33)
CREAT SERPL-MCNC: 0.76 MG/DL (ref 0.5–1.4)
FASTING STATUS PATIENT QL REPORTED: NORMAL
GFR SERPLBLD CREATININE-BSD FMLA CKD-EPI: 83 ML/MIN/1.73 M 2
GLOBULIN SER CALC-MCNC: 2.7 G/DL (ref 1.9–3.5)
GLUCOSE SERPL-MCNC: 85 MG/DL (ref 65–99)
HDLC SERPL-MCNC: 51 MG/DL
LDLC SERPL CALC-MCNC: 109 MG/DL
POTASSIUM SERPL-SCNC: 4.2 MMOL/L (ref 3.6–5.5)
PROT SERPL-MCNC: 6.7 G/DL (ref 6–8.2)
SODIUM SERPL-SCNC: 141 MMOL/L (ref 135–145)
TRIGL SERPL-MCNC: 113 MG/DL (ref 0–149)
TSH SERPL DL<=0.005 MIU/L-ACNC: 1.68 UIU/ML (ref 0.38–5.33)

## 2023-06-09 PROCEDURE — 80061 LIPID PANEL: CPT

## 2023-06-09 PROCEDURE — 80053 COMPREHEN METABOLIC PANEL: CPT

## 2023-06-09 PROCEDURE — 83036 HEMOGLOBIN GLYCOSYLATED A1C: CPT

## 2023-06-09 PROCEDURE — 82306 VITAMIN D 25 HYDROXY: CPT

## 2023-06-09 PROCEDURE — 36415 COLL VENOUS BLD VENIPUNCTURE: CPT

## 2023-06-09 PROCEDURE — 84443 ASSAY THYROID STIM HORMONE: CPT

## 2023-06-10 LAB
EST. AVERAGE GLUCOSE BLD GHB EST-MCNC: 111 MG/DL
HBA1C MFR BLD: 5.5 % (ref 4–5.6)

## 2023-06-19 ENCOUNTER — TELEPHONE (OUTPATIENT)
Dept: MEDICAL GROUP | Facility: IMAGING CENTER | Age: 73
End: 2023-06-19
Payer: MEDICARE

## 2023-07-13 ENCOUNTER — DOCUMENTATION (OUTPATIENT)
Dept: HEALTH INFORMATION MANAGEMENT | Facility: OTHER | Age: 73
End: 2023-07-13
Payer: MEDICARE

## 2023-10-03 ENCOUNTER — HOSPITAL ENCOUNTER (OUTPATIENT)
Dept: RADIOLOGY | Facility: MEDICAL CENTER | Age: 73
End: 2023-10-03
Attending: FAMILY MEDICINE
Payer: MEDICARE

## 2023-10-03 DIAGNOSIS — R92.8 ABNORMAL MAMMOGRAM OF RIGHT BREAST: ICD-10-CM

## 2023-10-03 DIAGNOSIS — R92.8 ABNORMAL MAMMOGRAM: ICD-10-CM

## 2023-10-03 PROCEDURE — 76642 ULTRASOUND BREAST LIMITED: CPT | Mod: RT

## 2024-03-05 ENCOUNTER — DOCUMENTATION (OUTPATIENT)
Dept: HEALTH INFORMATION MANAGEMENT | Facility: OTHER | Age: 74
End: 2024-03-05
Payer: MEDICARE

## 2024-03-22 ENCOUNTER — APPOINTMENT (OUTPATIENT)
Dept: FAMILY PLANNING/WOMEN'S HEALTH CLINIC | Facility: PHYSICIAN GROUP | Age: 74
End: 2024-03-22
Attending: FAMILY MEDICINE
Payer: MEDICARE

## 2024-05-08 ENCOUNTER — APPOINTMENT (OUTPATIENT)
Dept: FAMILY PLANNING/WOMEN'S HEALTH CLINIC | Facility: PHYSICIAN GROUP | Age: 74
End: 2024-05-08
Attending: FAMILY MEDICINE
Payer: MEDICARE

## 2024-11-26 ENCOUNTER — DOCUMENTATION (OUTPATIENT)
Dept: HEALTH INFORMATION MANAGEMENT | Facility: OTHER | Age: 74
End: 2024-11-26
Payer: MEDICARE

## 2025-01-07 SDOH — ECONOMIC STABILITY: FOOD INSECURITY: WITHIN THE PAST 12 MONTHS, YOU WORRIED THAT YOUR FOOD WOULD RUN OUT BEFORE YOU GOT MONEY TO BUY MORE.: NEVER TRUE

## 2025-01-07 SDOH — HEALTH STABILITY: PHYSICAL HEALTH: ON AVERAGE, HOW MANY MINUTES DO YOU ENGAGE IN EXERCISE AT THIS LEVEL?: 0 MIN

## 2025-01-07 SDOH — ECONOMIC STABILITY: INCOME INSECURITY: HOW HARD IS IT FOR YOU TO PAY FOR THE VERY BASICS LIKE FOOD, HOUSING, MEDICAL CARE, AND HEATING?: NOT VERY HARD

## 2025-01-07 SDOH — ECONOMIC STABILITY: FOOD INSECURITY: WITHIN THE PAST 12 MONTHS, THE FOOD YOU BOUGHT JUST DIDN'T LAST AND YOU DIDN'T HAVE MONEY TO GET MORE.: NEVER TRUE

## 2025-01-07 SDOH — HEALTH STABILITY: PHYSICAL HEALTH: ON AVERAGE, HOW MANY DAYS PER WEEK DO YOU ENGAGE IN MODERATE TO STRENUOUS EXERCISE (LIKE A BRISK WALK)?: 0 DAYS

## 2025-01-07 SDOH — ECONOMIC STABILITY: INCOME INSECURITY: IN THE LAST 12 MONTHS, WAS THERE A TIME WHEN YOU WERE NOT ABLE TO PAY THE MORTGAGE OR RENT ON TIME?: NO

## 2025-01-07 ASSESSMENT — SOCIAL DETERMINANTS OF HEALTH (SDOH)
HOW OFTEN DO YOU ATTENT MEETINGS OF THE CLUB OR ORGANIZATION YOU BELONG TO?: 1 TO 4 TIMES PER YEAR
WITHIN THE PAST 12 MONTHS, YOU WORRIED THAT YOUR FOOD WOULD RUN OUT BEFORE YOU GOT THE MONEY TO BUY MORE: NEVER TRUE
HOW OFTEN DO YOU HAVE A DRINK CONTAINING ALCOHOL: NEVER
HOW OFTEN DO YOU HAVE SIX OR MORE DRINKS ON ONE OCCASION: NEVER
HOW OFTEN DO YOU ATTEND CHURCH OR RELIGIOUS SERVICES?: NEVER
DO YOU BELONG TO ANY CLUBS OR ORGANIZATIONS SUCH AS CHURCH GROUPS UNIONS, FRATERNAL OR ATHLETIC GROUPS, OR SCHOOL GROUPS?: YES
HOW HARD IS IT FOR YOU TO PAY FOR THE VERY BASICS LIKE FOOD, HOUSING, MEDICAL CARE, AND HEATING?: NOT VERY HARD
HOW OFTEN DO YOU ATTEND CHURCH OR RELIGIOUS SERVICES?: NEVER
HOW OFTEN DO YOU GET TOGETHER WITH FRIENDS OR RELATIVES?: MORE THAN THREE TIMES A WEEK
DO YOU BELONG TO ANY CLUBS OR ORGANIZATIONS SUCH AS CHURCH GROUPS UNIONS, FRATERNAL OR ATHLETIC GROUPS, OR SCHOOL GROUPS?: YES
HOW OFTEN DO YOU GET TOGETHER WITH FRIENDS OR RELATIVES?: MORE THAN THREE TIMES A WEEK
IN A TYPICAL WEEK, HOW MANY TIMES DO YOU TALK ON THE PHONE WITH FAMILY, FRIENDS, OR NEIGHBORS?: NEVER
HOW MANY DRINKS CONTAINING ALCOHOL DO YOU HAVE ON A TYPICAL DAY WHEN YOU ARE DRINKING: PATIENT DOES NOT DRINK
IN A TYPICAL WEEK, HOW MANY TIMES DO YOU TALK ON THE PHONE WITH FAMILY, FRIENDS, OR NEIGHBORS?: NEVER
HOW OFTEN DO YOU ATTENT MEETINGS OF THE CLUB OR ORGANIZATION YOU BELONG TO?: 1 TO 4 TIMES PER YEAR
IN THE PAST 12 MONTHS, HAS THE ELECTRIC, GAS, OIL, OR WATER COMPANY THREATENED TO SHUT OFF SERVICE IN YOUR HOME?: NO

## 2025-01-07 ASSESSMENT — LIFESTYLE VARIABLES
HOW MANY STANDARD DRINKS CONTAINING ALCOHOL DO YOU HAVE ON A TYPICAL DAY: PATIENT DOES NOT DRINK
HOW OFTEN DO YOU HAVE SIX OR MORE DRINKS ON ONE OCCASION: NEVER
SKIP TO QUESTIONS 9-10: 1
HOW OFTEN DO YOU HAVE A DRINK CONTAINING ALCOHOL: NEVER
AUDIT-C TOTAL SCORE: 0

## 2025-01-10 ENCOUNTER — HOSPITAL ENCOUNTER (OUTPATIENT)
Dept: LAB | Facility: MEDICAL CENTER | Age: 75
End: 2025-01-10
Attending: FAMILY MEDICINE
Payer: MEDICARE

## 2025-01-10 ENCOUNTER — APPOINTMENT (OUTPATIENT)
Dept: MEDICAL GROUP | Facility: IMAGING CENTER | Age: 75
End: 2025-01-10
Payer: MEDICARE

## 2025-01-10 VITALS
BODY MASS INDEX: 37.39 KG/M2 | WEIGHT: 219 LBS | HEIGHT: 64 IN | DIASTOLIC BLOOD PRESSURE: 88 MMHG | RESPIRATION RATE: 16 BRPM | OXYGEN SATURATION: 94 % | HEART RATE: 80 BPM | SYSTOLIC BLOOD PRESSURE: 160 MMHG | TEMPERATURE: 97.4 F

## 2025-01-10 DIAGNOSIS — I10 ESSENTIAL HYPERTENSION: ICD-10-CM

## 2025-01-10 DIAGNOSIS — E78.2 MIXED HYPERLIPIDEMIA: ICD-10-CM

## 2025-01-10 DIAGNOSIS — Z78.0 POST-MENOPAUSAL: ICD-10-CM

## 2025-01-10 DIAGNOSIS — E55.9 VITAMIN D INSUFFICIENCY: ICD-10-CM

## 2025-01-10 DIAGNOSIS — H91.92 DECREASED HEARING OF LEFT EAR: ICD-10-CM

## 2025-01-10 DIAGNOSIS — J45.20 MILD INTERMITTENT ASTHMA WITHOUT COMPLICATION: ICD-10-CM

## 2025-01-10 DIAGNOSIS — G56.03 BILATERAL CARPAL TUNNEL SYNDROME: ICD-10-CM

## 2025-01-10 DIAGNOSIS — R73.09 ELEVATED GLUCOSE: ICD-10-CM

## 2025-01-10 DIAGNOSIS — F10.21 RECOVERING ALCOHOLIC (HCC): ICD-10-CM

## 2025-01-10 DIAGNOSIS — E66.9 OBESITY (BMI 30-39.9): ICD-10-CM

## 2025-01-10 DIAGNOSIS — H35.30 MACULAR DEGENERATION OF BOTH EYES, UNSPECIFIED TYPE: ICD-10-CM

## 2025-01-10 DIAGNOSIS — Z12.11 SCREEN FOR COLON CANCER: ICD-10-CM

## 2025-01-10 DIAGNOSIS — Z00.00 MEDICARE ANNUAL WELLNESS VISIT, INITIAL: ICD-10-CM

## 2025-01-10 LAB
25(OH)D3 SERPL-MCNC: 27 NG/ML (ref 30–100)
ALBUMIN SERPL BCP-MCNC: 4 G/DL (ref 3.2–4.9)
ALBUMIN/GLOB SERPL: 1.4 G/DL
ALP SERPL-CCNC: 106 U/L (ref 30–99)
ALT SERPL-CCNC: 14 U/L (ref 2–50)
ANION GAP SERPL CALC-SCNC: 11 MMOL/L (ref 7–16)
AST SERPL-CCNC: 25 U/L (ref 12–45)
BASOPHILS # BLD AUTO: 0.4 % (ref 0–1.8)
BASOPHILS # BLD: 0.03 K/UL (ref 0–0.12)
BILIRUB SERPL-MCNC: 0.6 MG/DL (ref 0.1–1.5)
BUN SERPL-MCNC: 17 MG/DL (ref 8–22)
CALCIUM ALBUM COR SERPL-MCNC: 9.9 MG/DL (ref 8.5–10.5)
CALCIUM SERPL-MCNC: 9.9 MG/DL (ref 8.5–10.5)
CHLORIDE SERPL-SCNC: 106 MMOL/L (ref 96–112)
CHOLEST SERPL-MCNC: 155 MG/DL (ref 100–199)
CO2 SERPL-SCNC: 23 MMOL/L (ref 20–33)
CREAT SERPL-MCNC: 0.81 MG/DL (ref 0.5–1.4)
EOSINOPHIL # BLD AUTO: 0.26 K/UL (ref 0–0.51)
EOSINOPHIL NFR BLD: 3.8 % (ref 0–6.9)
ERYTHROCYTE [DISTWIDTH] IN BLOOD BY AUTOMATED COUNT: 47.8 FL (ref 35.9–50)
EST. AVERAGE GLUCOSE BLD GHB EST-MCNC: 103 MG/DL
GFR SERPLBLD CREATININE-BSD FMLA CKD-EPI: 76 ML/MIN/1.73 M 2
GLOBULIN SER CALC-MCNC: 2.8 G/DL (ref 1.9–3.5)
GLUCOSE SERPL-MCNC: 99 MG/DL (ref 65–99)
HBA1C MFR BLD: 5.2 % (ref 4–5.6)
HCT VFR BLD AUTO: 42.7 % (ref 37–47)
HDLC SERPL-MCNC: 48 MG/DL
HGB BLD-MCNC: 14.1 G/DL (ref 12–16)
IMM GRANULOCYTES # BLD AUTO: 0.02 K/UL (ref 0–0.11)
IMM GRANULOCYTES NFR BLD AUTO: 0.3 % (ref 0–0.9)
LDLC SERPL CALC-MCNC: 80 MG/DL
LYMPHOCYTES # BLD AUTO: 2.15 K/UL (ref 1–4.8)
LYMPHOCYTES NFR BLD: 31.6 % (ref 22–41)
MCH RBC QN AUTO: 30.3 PG (ref 27–33)
MCHC RBC AUTO-ENTMCNC: 33 G/DL (ref 32.2–35.5)
MCV RBC AUTO: 91.8 FL (ref 81.4–97.8)
MONOCYTES # BLD AUTO: 0.48 K/UL (ref 0–0.85)
MONOCYTES NFR BLD AUTO: 7.1 % (ref 0–13.4)
NEUTROPHILS # BLD AUTO: 3.86 K/UL (ref 1.82–7.42)
NEUTROPHILS NFR BLD: 56.8 % (ref 44–72)
NRBC # BLD AUTO: 0 K/UL
NRBC BLD-RTO: 0 /100 WBC (ref 0–0.2)
PLATELET # BLD AUTO: 296 K/UL (ref 164–446)
PMV BLD AUTO: 10.7 FL (ref 9–12.9)
POTASSIUM SERPL-SCNC: 4.3 MMOL/L (ref 3.6–5.5)
PROT SERPL-MCNC: 6.8 G/DL (ref 6–8.2)
RBC # BLD AUTO: 4.65 M/UL (ref 4.2–5.4)
SODIUM SERPL-SCNC: 140 MMOL/L (ref 135–145)
TRIGL SERPL-MCNC: 135 MG/DL (ref 0–149)
TSH SERPL DL<=0.005 MIU/L-ACNC: 1.8 UIU/ML (ref 0.38–5.33)
WBC # BLD AUTO: 6.8 K/UL (ref 4.8–10.8)

## 2025-01-10 PROCEDURE — 82306 VITAMIN D 25 HYDROXY: CPT

## 2025-01-10 PROCEDURE — 3077F SYST BP >= 140 MM HG: CPT | Performed by: FAMILY MEDICINE

## 2025-01-10 PROCEDURE — G0438 PPPS, INITIAL VISIT: HCPCS | Performed by: FAMILY MEDICINE

## 2025-01-10 PROCEDURE — 99999 PR NO CHARGE: CPT | Performed by: FAMILY MEDICINE

## 2025-01-10 PROCEDURE — 36415 COLL VENOUS BLD VENIPUNCTURE: CPT

## 2025-01-10 PROCEDURE — 3079F DIAST BP 80-89 MM HG: CPT | Performed by: FAMILY MEDICINE

## 2025-01-10 PROCEDURE — 85025 COMPLETE CBC W/AUTO DIFF WBC: CPT

## 2025-01-10 PROCEDURE — 80061 LIPID PANEL: CPT

## 2025-01-10 PROCEDURE — 83036 HEMOGLOBIN GLYCOSYLATED A1C: CPT

## 2025-01-10 PROCEDURE — 84443 ASSAY THYROID STIM HORMONE: CPT

## 2025-01-10 PROCEDURE — 80053 COMPREHEN METABOLIC PANEL: CPT

## 2025-01-10 RX ORDER — ATORVASTATIN CALCIUM 10 MG/1
10 TABLET, FILM COATED ORAL
Qty: 100 TABLET | Refills: 3 | Status: SHIPPED | OUTPATIENT
Start: 2025-01-10

## 2025-01-10 RX ORDER — LOSARTAN POTASSIUM 25 MG/1
25 TABLET ORAL DAILY
Qty: 30 TABLET | Refills: 3 | Status: SHIPPED | OUTPATIENT
Start: 2025-01-10

## 2025-01-10 RX ORDER — ALBUTEROL SULFATE 90 UG/1
2 INHALANT RESPIRATORY (INHALATION) EVERY 4 HOURS PRN
Qty: 8.5 G | Refills: 3 | Status: SHIPPED | OUTPATIENT
Start: 2025-01-10

## 2025-01-10 ASSESSMENT — PATIENT HEALTH QUESTIONNAIRE - PHQ9: CLINICAL INTERPRETATION OF PHQ2 SCORE: 0

## 2025-01-10 ASSESSMENT — ACTIVITIES OF DAILY LIVING (ADL): BATHING_REQUIRES_ASSISTANCE: 0

## 2025-01-10 ASSESSMENT — ENCOUNTER SYMPTOMS: GENERAL WELL-BEING: FAIR

## 2025-01-10 NOTE — PROGRESS NOTES
Chief Complaint   Patient presents with    Medicare Annual Wellness    Other     Care gap Bone Density order if insurance covers it       HPI:  Martha Hernandez is a 74 y.o. here for Medicare Annual Wellness Visit   Specialists: optometrist.   Routine dental and eye exams.     Asthma-possibly associated with allergies has been using her albuterol more, using about 2-3 times a week.  Patient will try zyrtec, does not like to use a lot of medications.  Has had some sniffles and sneezes.    Developing macular degeneration-saw optometrist.  She is on PreserVision.    She is on atorvastatin 10 mg daily.  Taking multivitamin.    Notes some decreased hearing on the left ear.    Body mass index is 37.59 kg/m².  Does not exercise regularly.  Does a lot of reading.  Her diet varies.  Recovering alcoholic-last drink 1986.  Overall doing well.  Psoriasis-states it has been good.  Vertigo also has been good.  Some hand issues.  When typing might notice some numbness in her thumb areas.    History of headache, no new symptoms.   Elevated BP. No cuff at home.   No chest pain/sob/dizziness.     Patient Active Problem List    Diagnosis Date Noted    Recovering alcoholic (HCC) 06/07/2023    Post-traumatic osteoarthritis of left knee 07/18/2022    Hyperglycemia 07/18/2022    Class 2 obesity due to excess calories without serious comorbidity with body mass index (BMI) of 38.0 to 38.9 in adult 07/18/2022    Elevated blood pressure reading in office without diagnosis of hypertension 07/18/2022    Mixed hyperlipidemia 02/07/2020    Psoriasis 02/07/2020    Mild intermittent asthma without complication 01/29/2020    Vertigo 01/01/2014       Current Outpatient Medications   Medication Sig Dispense Refill    Non Formulary Request Preser vision x 2 daily      albuterol 108 (90 Base) MCG/ACT Aero Soln inhalation aerosol Inhale 2 Puffs every four hours as needed for Shortness of Breath. 8.5 g 0    atorvastatin (LIPITOR) 10 MG Tab Take 1  Tablet by mouth at bedtime. 100 Tablet 0    coenzyme Q-10 30 MG capsule Take 60 mg by mouth every day.      therapeutic multivitamin-minerals (THERAGRAN-M) Tab Take 1 Tab by mouth every day.       No current facility-administered medications for this visit.          Current supplements as per medication list.     Allergies: Ampicillin and Penicillins    Current social contact/activities: reading    She  reports that she has never smoked. She has never used smokeless tobacco. She reports that she does not currently use alcohol. She reports that she does not use drugs.  Counseling given: Not Answered      ROS:    Gait: Uses no assistive device  Ostomy: No  Other tubes: No  Amputations: No  Chronic oxygen use: No  Last eye exam: 8/2024  Wears hearing aids: No   : Denies any urinary leakage during the last 6 months    Screening:    Reviewed.    Depression Screening  Little interest or pleasure in doing things?  0 - not at all  Feeling down, depressed , or hopeless? 0 - not at all  Patient Health Questionnaire Score: 0     If depressive symptoms identified deferred to follow up visit unless specifically addressed in assessment and plan.    Interpretation of PHQ-9 Total Score   Score Severity   1-4 No Depression   5-9 Mild Depression   10-14 Moderate Depression   15-19 Moderately Severe Depression   20-27 Severe Depression    Screening for Cognitive Impairment  Do you or any of your friends or family members have any concern about your memory? No  Three Minute Recall (Leader, Season, Table) 3/3    Sb clock face with all 12 numbers and set the hands to show 10 minutes after 11.  Yes    Cognitive concerns identified deferred for follow up unless specifically addressed in assessment and plan.    Fall Risk Assessment  Has the patient had two or more falls in the last year or any fall with injury in the last year?  No    Safety Assessment  Do you always wear your seatbelt?  Yes  Any changes to home needed to function  safely? No  Difficulty hearing.  Yes  Patient counseled about all safety risks that were identified.    Functional Assessment ADLs  Are there any barriers preventing you from cooking for yourself or meeting nutritional needs?  No.    Are there any barriers preventing you from driving safely or obtaining transportation?  No.    Are there any barriers preventing you from using a telephone or calling for help?  No    Are there any barriers preventing you from shopping?  No.    Are there any barriers preventing you from taking care of your own finances?  No    Are there any barriers preventing you from managing your medications?  No    Are there any barriers preventing you from showering, bathing or dressing yourself? No    Are there any barriers preventing you from doing housework or laundry? Yes  Are there any barriers preventing you from using the toilet?No  Are you currently engaging in any exercise or physical activity?  No.      Self-Assessment of Health  What is your perception of your health? Fair    Do you sleep more than six hours a night? Yes    In the past 7 days, how much did pain keep you from doing your normal work? None    Do you spend quality time with family or friends (virtually or in person)? Yes both  Do you usually eat a heart healthy diet that constists of a variety of fruits, vegetables, whole grains and fiber? Yes    Do you eat foods high in fat and/or Fast Food more than three times per week? No    How concerned are you that your medical conditions are not being well managed? a little Asthma and arthrosis , hands knees and hip   Are you worried that in the next 2 months, you may not have stable housing that you own, rent, or stay in as part of a household? No      Advance Care Planning  Do you have an Advance Directive, Living Will, Durable Power of , or POLST? Yes    Living Will Durable Power of  POLST is on file      Health Maintenance Summary            Overdue - Bone  Density Scan (Every 5 Years) Never done      No completion history exists for this topic.              Overdue - Colorectal Cancer Screening (Colon Cancer Screening Annual FIT - Yearly) Order placed this encounter      07/30/2021  OCCULT BLOOD FECES IMMUNOASSAY              Overdue - Annual Wellness Visit (Yearly) Order placed this encounter      07/18/2022  Level of Service: MA ANNUAL WELLNESS VISIT-INCLUDES PPPS SUBSEQUE*    07/20/2021  Done    07/20/2021  Level of Service: MA PREVENTIVE VISIT,EST,65 & OVER              Mammogram (Every 2 Years) Next due on 4/14/2025 04/14/2023  MA-DIAGNOSTIC MAMMO BILAT W/TOMOSYNTHESIS W/CAD    07/28/2021  MA-DIAGNOSTIC MAMMO BILAT W/TOMOSYNTHESIS W/CAD    03/13/2020  MA-DIAGNOSTIC MAMMO LEFT W/TOMOSYNTHESIS W/O CAD    03/10/2020  MA-SCREENING MAMMO BILAT W/TOMOSYNTHESIS W/CAD              IMM DTaP/Tdap/Td Vaccine (2 - Td or Tdap) Next due on 2/7/2030 02/07/2020  Imm Admin: Tdap Vaccine              Hepatitis C Screening  Completed      01/31/2020  Hepatitis C Antibody component of HEP C VIRUS ANTIBODY              Pneumococcal Vaccine: 65+ Years (Series Information) Completed      07/20/2021  Imm Admin: Pneumococcal polysaccharide vaccine (PPSV-23)    02/07/2020  Imm Admin: Pneumococcal Conjugate Vaccine (Prevnar/PCV-13)              Zoster (Shingles) Vaccines (Series Information) Completed      09/11/2021  Imm Admin: Zoster Vaccine Recombinant (RZV) (SHINGRIX)    07/21/2021  Imm Admin: Zoster Vaccine Recombinant (RZV) (SHINGRIX)              COVID-19 Vaccine (Series Information) Completed      09/27/2024  Imm Admin: COVID-19, mRNA, LNP-S, PF, mary-sucrose, 30 mcg/0.3 mL    09/21/2023  Imm Admin: Comirnaty (Covid-19 Vaccine, Mrna, 1490-1869 Formula)    01/24/2022  Imm Admin: PFIZER PURPLE CAP SARS-COV-2 VACCINATION (12+)    02/19/2021  Imm Admin: PFIZER PURPLE CAP SARS-COV-2 VACCINATION (12+)    01/26/2021  Imm Admin: PFIZER PURPLE CAP SARS-COV-2 VACCINATION (12+)     Only the first 5 history entries have been loaded, but more history exists.              Influenza Vaccine (Series Information) Completed      09/29/2024  Patient Reported Immunization: Influenza, unspecified formulation    09/27/2024  Outside Immunization: Influenza, High Dose    09/21/2023  Imm Admin: Influenza Vaccine Adult HD    09/19/2022  Imm Admin: Influenza Vaccine Adult HD    11/12/2021  Imm Admin: Influenza Vaccine Adult HD    Only the first 5 history entries have been loaded, but more history exists.              Hepatitis A Vaccine (Hep A) (Series Information) Aged Out      No completion history exists for this topic.              Hepatitis B Vaccine (Hep B) (Series Information) Aged Out      No completion history exists for this topic.              HPV Vaccines (Series Information) Aged Out      No completion history exists for this topic.              Polio Vaccine (Inactivated Polio) (Series Information) Aged Out      No completion history exists for this topic.              Meningococcal Immunization (Series Information) Aged Out      No completion history exists for this topic.                    Patient Care Team:  Romi Aguilera M.D. as PCP - General (Family Medicine)        Social History     Tobacco Use    Smoking status: Never    Smokeless tobacco: Never   Vaping Use    Vaping status: Never Used   Substance Use Topics    Alcohol use: Not Currently     Comment: 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    Drug use: Never     Family History   Problem Relation Age of Onset    COPD Mother     Alcohol abuse Mother     Heart Disease Father      She  has a past medical history of Hyperlipidemia, Psoriasis, and Recovering alcoholic (HCC).   Past Surgical History:   Procedure Laterality Date    PB TOTAL KNEE ARTHROPLASTY Left 12/12/2022    Procedure: LEFT TOTAL KNEE ARTHROPLASTY;  Surgeon: Carlyle Mane M.D.;  Location: [facility];  Service: Orthopedics    KNEE ARTHROSCOPY Left   "   VAGINAL HYSTERECTOMY TOTAL      1973       Exam:   BP (!) 160/88 (BP Location: Left arm, Patient Position: Sitting, BP Cuff Size: Adult long)   Pulse 80   Temp 36.3 °C (97.4 °F) (Temporal)   Resp 16   Ht 1.626 m (5' 4\")   Wt 99.3 kg (219 lb)   SpO2 94%  Body mass index is 37.59 kg/m².    Hearing good.    Dentition good  Alert, oriented in no acute distress.  Eye contact is good, speech goal directed, affect calm  Constitutional: She appears well-developed and well-nourished. She appears not diaphoretic. No distress.   HENT: Right Ear: External ear normal. Left Ear: External ear normal. Tympanic membranes clear and intact.   Nose: Nose normal.   Mouth/Throat: Oropharynx is clear and moist. No oropharyngeal exudate.     Eyes: Conjunctivae and extraocular motions are normal. Pupils are equal, round, and reactive to light. No scleral icterus.   Neck: Normal range of motion. Neck supple. No thyromegaly present.   Cardiovascular: Normal rate, regular rhythm, normal heart sounds and intact distal pulses.  Exam reveals no gallop and no friction rub.  No murmur heard. No carotid bruits.   Pulmonary/Chest: Effort normal and breath sounds normal. No respiratory distress. She has no wheezes. She has no rales.   Abdominal: Soft. Bowel sounds are normal. She exhibits no distension and no mass. No tenderness. She has no rebound and no guarding.   Lymphadenopathy:  She has no cervical adenopathy.   Neurological: She is alert. She has normal reflexes. No cranial nerve deficit. She exhibits normal muscle tone.   Skin: Skin is warm and dry. No rash noted. She is not diaphoretic. No erythema.   Psychiatric: She has a normal mood and affect. Her behavior is normal.   Musculoskeletal: She exhibits no edema. Full strength throughout. 2+ DTR throughout.   Negative phalen's and tinel's.     Assessment and Plan. The following treatment and monitoring plan is recommended:      75 yo female    1. Medicare annual wellness visit, " initial   Recommend healthy diet and routine exercise.  Reviewed consideration of exercise options.       2. Mild intermittent asthma without complication -recently with slight increase use of albuterol.  Exam is clear.  Possibly associated with allergens.    She will try Zyrtec therapy.  Continue albuterol as needed.  Consider Singulair or other inhaled corticosteroid therapy in future as needed. CBC WITH DIFFERENTIAL    Comp Metabolic Panel    albuterol 108 (90 Base) MCG/ACT Aero Soln inhalation aerosol      3. Macular degeneration of both eyes, unspecified type -has seen optometry.    Will refer to ophthalmology for further management. Referral to Ophthalmology      4. Mixed hyperlipidemia -stable.    Continue atorvastatin 10 mg daily.  Recommend low-cholesterol, high-fiber diet and routine exercise. Comp Metabolic Panel    Lipid Profile    TSH WITH REFLEX TO FT4    atorvastatin (LIPITOR) 10 MG Tab      5. Elevated glucose   Recommend healthy diet and routine exercise.  Monitor. Comp Metabolic Panel    HEMOGLOBIN A1C      6. Vitamin D insufficiency -on supplement therapy.  Monitor. VITAMIN D,25 HYDROXY (DEFICIENCY)      7. Decreased hearing of left ear  Referral to Audiology      8. Screen for colon cancer  Cologuard® colon cancer screening      9. Post-menopausal  DS-BONE DENSITY STUDY (DEXA)      10. Recovering alcoholic (HCC) -since 1996.  She has been otherwise stable.    Continue to monitor.  Continue routine self-care activities.       11. Obesity (BMI 30-39.9)   Patient's body mass index is 37.59 kg/m². Exercise and nutrition counseling were performed at this visit.      12.  Carpal tunnel syndrome bilaterally-appears mild.  Recommend routine conditioning exercises and use of carpal tunnel wrist splints during activities.  Continue to monitor.    13.   Essential hypertension- uncontrolled. Asymptomatic. Start losartan 25 mg daily.   Recommend heart healthy/low cholesterol/high fiber/low sugar/low processed  food diet and routine exercise. Monitor.    Recommend routine dental and eye exam.   POLST form given.     Services suggested: No services needed at this time  Health Care Screening: Age-appropriate preventive services recommended by USPTF and ACIP covered by Medicare were discussed today. Services ordered if indicated and agreed upon by the patient.  Referrals offered: Community-based lifestyle interventions to reduce health risks and promote self-management and wellness, fall prevention, nutrition, physical activity, tobacco-use cessation, weight loss, and mental health services as per orders if indicated.    Discussion today about general wellness and lifestyle habits:    Prevent falls and reduce trip hazards; Cautioned about securing or removing rugs.  Have a working fire alarm and carbon monoxide detector;   Engage in regular physical activity and social activities     Follow-up: Follow-up in 6 to 8 weeks for review of lab and imaging.  Continue routine annual well visit.    This medical record contains text that has been entered with the assistance of computer voice recognition and dictation software.  Therefore, it may contain unintended errors in text, spelling, punctuation, or grammar.

## 2025-01-24 LAB — NONINV COLON CA DNA+OCC BLD SCRN STL QL: NEGATIVE

## 2025-02-13 ENCOUNTER — APPOINTMENT (OUTPATIENT)
Dept: RADIOLOGY | Facility: MEDICAL CENTER | Age: 75
End: 2025-02-13
Attending: FAMILY MEDICINE
Payer: MEDICARE

## 2025-02-13 DIAGNOSIS — Z12.31 VISIT FOR SCREENING MAMMOGRAM: ICD-10-CM

## 2025-02-24 ENCOUNTER — APPOINTMENT (OUTPATIENT)
Dept: RADIOLOGY | Facility: MEDICAL CENTER | Age: 75
End: 2025-02-24
Attending: FAMILY MEDICINE
Payer: MEDICARE

## 2025-03-05 ENCOUNTER — OFFICE VISIT (OUTPATIENT)
Dept: MEDICAL GROUP | Facility: IMAGING CENTER | Age: 75
End: 2025-03-05
Payer: MEDICARE

## 2025-03-05 VITALS
BODY MASS INDEX: 37.56 KG/M2 | DIASTOLIC BLOOD PRESSURE: 76 MMHG | WEIGHT: 220 LBS | RESPIRATION RATE: 16 BRPM | OXYGEN SATURATION: 96 % | SYSTOLIC BLOOD PRESSURE: 120 MMHG | TEMPERATURE: 98.1 F | HEIGHT: 64 IN | HEART RATE: 79 BPM

## 2025-03-05 DIAGNOSIS — E55.9 VITAMIN D INSUFFICIENCY: ICD-10-CM

## 2025-03-05 DIAGNOSIS — I10 ESSENTIAL HYPERTENSION: ICD-10-CM

## 2025-03-05 DIAGNOSIS — Z12.31 ENCOUNTER FOR SCREENING MAMMOGRAM FOR MALIGNANT NEOPLASM OF BREAST: ICD-10-CM

## 2025-03-05 DIAGNOSIS — R74.8 ELEVATED ALKALINE PHOSPHATASE LEVEL: ICD-10-CM

## 2025-03-05 DIAGNOSIS — E78.2 MIXED HYPERLIPIDEMIA: ICD-10-CM

## 2025-03-05 DIAGNOSIS — L82.1 SK (SEBORRHEIC KERATOSIS): ICD-10-CM

## 2025-03-05 DIAGNOSIS — Z12.83 SKIN CANCER SCREENING: ICD-10-CM

## 2025-03-05 PROCEDURE — 99214 OFFICE O/P EST MOD 30 MIN: CPT | Mod: 25 | Performed by: FAMILY MEDICINE

## 2025-03-05 PROCEDURE — 17110 DESTRUCTION B9 LES UP TO 14: CPT | Performed by: FAMILY MEDICINE

## 2025-03-05 PROCEDURE — 3074F SYST BP LT 130 MM HG: CPT | Performed by: FAMILY MEDICINE

## 2025-03-05 PROCEDURE — 3078F DIAST BP <80 MM HG: CPT | Performed by: FAMILY MEDICINE

## 2025-03-05 ASSESSMENT — FIBROSIS 4 INDEX: FIB4 SCORE: 1.670382761952652404

## 2025-03-05 NOTE — PROGRESS NOTES
SUBJECTIVE:    Chief Complaint   Patient presents with    Follow-Up     BP and lab results 1/10/2025    Other     Referral to dermatologist  Couple of mole, abdomen left side , flaky and middle of the chest   Pt report change color and larger        HPI:     Martha Hernandez is a 74 y.o. female with hypertension and hyperlipidemia here for lab review.    Hypertension-stable on losartan 25 mg daily.  Hyperlipidemia-stable on atorvastatin 10 mg daily.  Tolerates well.  Vitamin D 27- vitamin D3, started on 2000 IU daily. Previously was just on PreserVision.  Slightly elevated alkaline phosphatase level.  No current GI symptoms.  Notes some skin lesions of her left chest area and left abdominal region-the areas might get irritated or caught on something.  Areas can be itchy.  Has noted some darkening of the chest lesion.  Last skin check was about 3 years ago with dermatology.    ROS:  No recent fevers or chills. No nausea or vomiting. No diarrhea. No chest pains or shortness of breath. No lower extremity edema.    Current Outpatient Medications on File Prior to Visit   Medication Sig Dispense Refill    Non Formulary Request Preser vision x 2 daily      albuterol 108 (90 Base) MCG/ACT Aero Soln inhalation aerosol Inhale 2 Puffs every four hours as needed for Shortness of Breath. 8.5 g 3    atorvastatin (LIPITOR) 10 MG Tab Take 1 Tablet by mouth at bedtime. 100 Tablet 3    losartan (COZAAR) 25 MG Tab Take 1 Tablet by mouth every day. 30 Tablet 3    coenzyme Q-10 30 MG capsule Take 60 mg by mouth every day.      therapeutic multivitamin-minerals (THERAGRAN-M) Tab Take 1 Tab by mouth every day.       No current facility-administered medications on file prior to visit.       Allergies   Allergen Reactions    Ampicillin Hives    Penicillins        Patient Active Problem List    Diagnosis Date Noted    Recovering alcoholic (HCC) 06/07/2023    Post-traumatic osteoarthritis of left knee 07/18/2022    Hyperglycemia  "07/18/2022    Class 2 obesity due to excess calories without serious comorbidity with body mass index (BMI) of 38.0 to 38.9 in adult 07/18/2022    Elevated blood pressure reading in office without diagnosis of hypertension 07/18/2022    Mixed hyperlipidemia 02/07/2020    Psoriasis 02/07/2020    Mild intermittent asthma without complication 01/29/2020    Vertigo 01/01/2014       Past Medical History:   Diagnosis Date    Hyperlipidemia     Psoriasis     Recovering alcoholic (HCC)     since June 1986         OBJECTIVE:   /76 (BP Location: Left arm, Patient Position: Sitting, BP Cuff Size: Adult)   Pulse 79   Temp 36.7 °C (98.1 °F) (Temporal)   Resp 16   Ht 1.626 m (5' 4\")   Wt 99.8 kg (220 lb)   SpO2 96%   BMI 37.76 kg/m²   General: Well-developed well-nourished female, no acute distress  Neck: supple, no lymphadenopathy- cervical or supraclavicular, no thyromegaly  Cardiovascular: regular rate and rhythm, no murmurs, gallops, rubs  Lungs: clear to auscultation bilaterally, no wheezes, crackles, or rhonchi  Abdomen: +bowel sounds, soft, nontender, nondistended, no rebound, no guarding, no hepatosplenomegaly  Extremities: no cyanosis, clubbing, edema  Skin: Warm and dry. Left lower abdomen region with a light brown plaque with stuck on appearance ~1.5 cm, left mid chest region medial to breast with ~ 1 cm dark brown plaque with stuck on appearance.   Psych: appropriate mood and affect     Latest Reference Range & Units 01/10/25 09:25 01/18/25 06:10   WBC 4.8 - 10.8 K/uL 6.8    RBC 4.20 - 5.40 M/uL 4.65    Hemoglobin 12.0 - 16.0 g/dL 14.1    Hematocrit 37.0 - 47.0 % 42.7    MCV 81.4 - 97.8 fL 91.8    MCH 27.0 - 33.0 pg 30.3    MCHC 32.2 - 35.5 g/dL 33.0    RDW 35.9 - 50.0 fL 47.8    Platelet Count 164 - 446 K/uL 296    MPV 9.0 - 12.9 fL 10.7    Neutrophils-Polys 44.00 - 72.00 % 56.80    Neutrophils (Absolute) 1.82 - 7.42 K/uL 3.86    Lymphocytes 22.00 - 41.00 % 31.60    Lymphs (Absolute) 1.00 - 4.80 K/uL " 2.15    Monocytes 0.00 - 13.40 % 7.10    Monos (Absolute) 0.00 - 0.85 K/uL 0.48    Eosinophils 0.00 - 6.90 % 3.80    Eos (Absolute) 0.00 - 0.51 K/uL 0.26    Basophils 0.00 - 1.80 % 0.40    Baso (Absolute) 0.00 - 0.12 K/uL 0.03    Immature Granulocytes 0.00 - 0.90 % 0.30    Immature Granulocytes (abs) 0.00 - 0.11 K/uL 0.02    Nucleated RBC 0.00 - 0.20 /100 WBC 0.00    NRBC (Absolute) K/uL 0.00    Sodium 135 - 145 mmol/L 140    Potassium 3.6 - 5.5 mmol/L 4.3    Chloride 96 - 112 mmol/L 106    Co2 20 - 33 mmol/L 23    Anion Gap 7.0 - 16.0  11.0    Glucose 65 - 99 mg/dL 99    Bun 8 - 22 mg/dL 17    Creatinine 0.50 - 1.40 mg/dL 0.81    GFR (CKD-EPI) >60 mL/min/1.73 m 2 76    Calcium 8.5 - 10.5 mg/dL 9.9    Correct Calcium 8.5 - 10.5 mg/dL 9.9    AST(SGOT) 12 - 45 U/L 25    ALT(SGPT) 2 - 50 U/L 14    Alkaline Phosphatase 30 - 99 U/L 106 (H)    Total Bilirubin 0.1 - 1.5 mg/dL 0.6    Albumin 3.2 - 4.9 g/dL 4.0    Total Protein 6.0 - 8.2 g/dL 6.8    Globulin 1.9 - 3.5 g/dL 2.8    A-G Ratio g/dL 1.4    Glycohemoglobin 4.0 - 5.6 % 5.2    Estim. Avg Glu mg/dL 103    Cholesterol,Tot 100 - 199 mg/dL 155    Triglycerides 0 - 149 mg/dL 135    HDL >=40 mg/dL 48    LDL <100 mg/dL 80    25-Hydroxy   Vitamin D 25 30 - 100 ng/mL 27 (L)    TSH 0.380 - 5.330 uIU/mL 1.800    COLOGUARD RESULT Negative   Negative   (H): Data is abnormally high  (L): Data is abnormally low    Procedure note: Area left abdomen and left chest wall described above on skin were treated with liquid nitrogen for 30 seconds. Patient tolerated well.       ASSESSMENT/PLAN:    74 y.o.female       1. Essential hypertension-stable.  Continue on losartan 25 mg daily. Comp Metabolic Panel      2. Mixed hyperlipidemia-stable.  Continue low-cholesterol, high-fiber diet and routine exercise as tolerated.  Continue atorvastatin 10 mg daily. Comp Metabolic Panel    Lipid Profile      3. Vitamin D insufficiency   Recently started vitamin D 3, 2000 IU daily.  Continue monitor.   Recommend take supplement with food. VITAMIN D,25 HYDROXY (DEFICIENCY)      4. Encounter for screening mammogram for malignant neoplasm of breast  MA-SCREENING MAMMO BILAT W/TOMOSYNTHESIS W/CAD      5. SK (seborrheic keratosis) -has irritation, itching and catch of lesion.   2 lesions treated with liquid nitrogen- abdomen, chest area. Referral to Dermatology      6. Skin cancer screening  Referral to Dermatology      7.  Elevated blood phosphatase level-mild.  Monitor in future.      Recommend routine lab work in 6 months and follow-up at that time.    Return in about 6 months (around 9/5/2025).    This medical record contains text that has been entered with the assistance of computer voice recognition and dictation software.  Therefore, it may contain unintended errors in text, spelling, punctuation, or grammar.

## 2025-04-15 NOTE — Clinical Note
REFERRAL APPROVAL NOTICE         Sent on April 15, 2025                   Martha Hernandez  [address]  [place] [address]                   Dear Ms. Hernandez,    After a careful review of the medical information and benefit coverage, [facility] has processed your referral. See below for additional details.    If applicable, you must be actively enrolled with your insurance for coverage of the authorized service. If you have any questions regarding your coverage, please contact your insurance directly.    REFERRAL INFORMATION   Referral #:  [id number]  Referred-To Department    Referred-By Provider:  Dermatology    Romi Aguilera M.D.   [facility]      [address] [place] [address]  [phone] [address]  [place] [id number] [phone]    Referral Start Date:  03/05/2025  Referral End Date:   03/05/2026             SCHEDULING  If you do not already have an appointment, please call [phone] to make an appointment.     MORE INFORMATION  If you do not already have a [employer] account, sign up at: [email or web address]  You can access your medical information, make appointments, see lab results, billing information, and more.  If you have questions regarding this referral, please contact  the [facility] Referrals department at:             [phone]. Monday - Friday 8:00AM - 5:00PM.     Sincerely,    [facility]

## 2025-04-21 ENCOUNTER — HOSPITAL ENCOUNTER (OUTPATIENT)
Dept: RADIOLOGY | Facility: MEDICAL CENTER | Age: 75
End: 2025-04-21
Attending: FAMILY MEDICINE
Payer: MEDICARE

## 2025-04-21 DIAGNOSIS — Z12.31 ENCOUNTER FOR SCREENING MAMMOGRAM FOR MALIGNANT NEOPLASM OF BREAST: ICD-10-CM

## 2025-04-21 DIAGNOSIS — Z78.0 POST-MENOPAUSAL: ICD-10-CM

## 2025-04-21 PROCEDURE — 77067 SCR MAMMO BI INCL CAD: CPT

## 2025-04-21 PROCEDURE — 77080 DXA BONE DENSITY AXIAL: CPT

## 2025-05-09 ENCOUNTER — RESULTS FOLLOW-UP (OUTPATIENT)
Dept: MEDICAL GROUP | Facility: IMAGING CENTER | Age: 75
End: 2025-05-09

## 2025-06-02 ENCOUNTER — HOSPITAL ENCOUNTER (OUTPATIENT)
Dept: LAB | Facility: MEDICAL CENTER | Age: 75
End: 2025-06-02
Attending: FAMILY MEDICINE
Payer: MEDICARE

## 2025-06-02 DIAGNOSIS — E55.9 VITAMIN D INSUFFICIENCY: ICD-10-CM

## 2025-06-02 DIAGNOSIS — I10 ESSENTIAL HYPERTENSION: ICD-10-CM

## 2025-06-02 DIAGNOSIS — E78.2 MIXED HYPERLIPIDEMIA: ICD-10-CM

## 2025-06-02 LAB
25(OH)D3 SERPL-MCNC: 56 NG/ML (ref 30–100)
ALBUMIN SERPL BCP-MCNC: 4 G/DL (ref 3.2–4.9)
ALBUMIN/GLOB SERPL: 1.4 G/DL
ALP SERPL-CCNC: 93 U/L (ref 30–99)
ALT SERPL-CCNC: 18 U/L (ref 2–50)
ANION GAP SERPL CALC-SCNC: 15 MMOL/L (ref 7–16)
AST SERPL-CCNC: 25 U/L (ref 12–45)
BILIRUB SERPL-MCNC: 0.6 MG/DL (ref 0.1–1.5)
BUN SERPL-MCNC: 21 MG/DL (ref 8–22)
CALCIUM ALBUM COR SERPL-MCNC: 9.8 MG/DL (ref 8.5–10.5)
CALCIUM SERPL-MCNC: 9.8 MG/DL (ref 8.5–10.5)
CHLORIDE SERPL-SCNC: 106 MMOL/L (ref 96–112)
CHOLEST SERPL-MCNC: 154 MG/DL (ref 100–199)
CO2 SERPL-SCNC: 20 MMOL/L (ref 20–33)
CREAT SERPL-MCNC: 0.91 MG/DL (ref 0.5–1.4)
GFR SERPLBLD CREATININE-BSD FMLA CKD-EPI: 66 ML/MIN/1.73 M 2
GLOBULIN SER CALC-MCNC: 2.8 G/DL (ref 1.9–3.5)
GLUCOSE SERPL-MCNC: 103 MG/DL (ref 65–99)
HDLC SERPL-MCNC: 49 MG/DL
LDLC SERPL CALC-MCNC: 80 MG/DL
POTASSIUM SERPL-SCNC: 4.2 MMOL/L (ref 3.6–5.5)
PROT SERPL-MCNC: 6.8 G/DL (ref 6–8.2)
SODIUM SERPL-SCNC: 141 MMOL/L (ref 135–145)
TRIGL SERPL-MCNC: 125 MG/DL (ref 0–149)

## 2025-06-02 PROCEDURE — 82306 VITAMIN D 25 HYDROXY: CPT

## 2025-06-02 PROCEDURE — 80053 COMPREHEN METABOLIC PANEL: CPT

## 2025-06-02 PROCEDURE — 80061 LIPID PANEL: CPT

## 2025-06-02 PROCEDURE — 36415 COLL VENOUS BLD VENIPUNCTURE: CPT

## 2025-06-04 ENCOUNTER — RESULTS FOLLOW-UP (OUTPATIENT)
Dept: MEDICAL GROUP | Facility: IMAGING CENTER | Age: 75
End: 2025-06-04

## 2025-06-12 ENCOUNTER — OFFICE VISIT (OUTPATIENT)
Dept: MEDICAL GROUP | Facility: IMAGING CENTER | Age: 75
End: 2025-06-12
Payer: MEDICARE

## 2025-06-12 VITALS
HEART RATE: 79 BPM | OXYGEN SATURATION: 96 % | WEIGHT: 226 LBS | TEMPERATURE: 97.5 F | SYSTOLIC BLOOD PRESSURE: 122 MMHG | BODY MASS INDEX: 38.58 KG/M2 | DIASTOLIC BLOOD PRESSURE: 74 MMHG | HEIGHT: 64 IN | RESPIRATION RATE: 16 BRPM

## 2025-06-12 DIAGNOSIS — E78.2 MIXED HYPERLIPIDEMIA: ICD-10-CM

## 2025-06-12 DIAGNOSIS — M85.852 OSTEOPENIA OF NECK OF LEFT FEMUR: ICD-10-CM

## 2025-06-12 DIAGNOSIS — Z02.89 ENCOUNTER FOR COMPLETION OF FORM WITH PATIENT: ICD-10-CM

## 2025-06-12 DIAGNOSIS — I10 ESSENTIAL HYPERTENSION: ICD-10-CM

## 2025-06-12 DIAGNOSIS — J45.20 MILD INTERMITTENT ASTHMA WITHOUT COMPLICATION: ICD-10-CM

## 2025-06-12 DIAGNOSIS — R73.01 ELEVATED FASTING GLUCOSE: ICD-10-CM

## 2025-06-12 DIAGNOSIS — E55.9 VITAMIN D INSUFFICIENCY: ICD-10-CM

## 2025-06-12 DIAGNOSIS — R74.8 ELEVATED ALKALINE PHOSPHATASE LEVEL: ICD-10-CM

## 2025-06-12 PROCEDURE — 3074F SYST BP LT 130 MM HG: CPT | Performed by: FAMILY MEDICINE

## 2025-06-12 PROCEDURE — 99214 OFFICE O/P EST MOD 30 MIN: CPT | Performed by: FAMILY MEDICINE

## 2025-06-12 PROCEDURE — 3078F DIAST BP <80 MM HG: CPT | Performed by: FAMILY MEDICINE

## 2025-06-12 ASSESSMENT — FIBROSIS 4 INDEX: FIB4 SCORE: 1.47

## 2025-06-12 NOTE — PROGRESS NOTES
"  SUBJECTIVE:    Chief Complaint   Patient presents with    Other     Imaging results  Lab results       HPI:     Martha Hernandez is a 74 y.o. female here for lab and imaging review.    DEXA scan notes osteopenia.  This is her first DEXA scan, no prior for comparison.  Hips, knees, back get tired more easily now.  Declined physical therapy.     Vitamin D insufficiency- improved. 2000 IU daily supplement.     Asthma affects her and may limit exercise lately.  She does have albuterol to use as needed.  She is currently not on allergy medication but is interested in taking something.    Hypertension.  Losartan 25 mg daily. BP stable, 120s.     Mild hyperlipidemia-improved and stable.    Elevated fasting glucose-past A1c stable.  Prior elevated alkaline phosphatase improved.    DMV form, no isuses with neck/extremities.   No recent mva. Eye exam done per eye specialist.      ROS:  No recent fevers or chills. No nausea or vomiting. No diarrhea. No chest pains or shortness of breath. No lower extremity edema.    Medications Ordered Prior to Encounter[1]    Allergies[2]    Patient Active Problem List    Diagnosis Date Noted    Recovering alcoholic (HCC) 06/07/2023    Post-traumatic osteoarthritis of left knee 07/18/2022    Hyperglycemia 07/18/2022    Class 2 obesity due to excess calories without serious comorbidity with body mass index (BMI) of 38.0 to 38.9 in adult 07/18/2022    Elevated blood pressure reading in office without diagnosis of hypertension 07/18/2022    Mixed hyperlipidemia 02/07/2020    Psoriasis 02/07/2020    Mild intermittent asthma without complication 01/29/2020    Vertigo 01/01/2014       Past Medical History[3]      OBJECTIVE:   /74 (BP Location: Left arm, Patient Position: Sitting, BP Cuff Size: Large adult)   Pulse 79   Temp 36.4 °C (97.5 °F) (Temporal)   Resp 16   Ht 1.626 m (5' 4\")   Wt 103 kg (226 lb)   SpO2 96%   BMI 38.79 kg/m²   General: Well-developed well-nourished " female, no acute distress  HEENT: oropharynx clear, eyes clear, PERRL, EOMI TMs clear and intact, cn 2-12 intact bilaterally  Neck: supple, no lymphadenopathy- cervical or supraclavicular, no thyromegaly  Cardiovascular: regular rate and rhythm, no murmurs, gallops, rubs  Lungs: clear to auscultation bilaterally, no wheezes, crackles, or rhonchi  Abdomen: +bowel sounds, soft, nontender, nondistended, no rebound, no guarding, no hepatosplenomegaly  Extremities: no cyanosis, clubbing, edema  Skin: Warm and dry  Psych: appropriate mood and affect  Neuro: 2+ DTR throughout, 5/5 strength throughout       Latest Reference Range & Units 06/02/25 07:50   Sodium 135 - 145 mmol/L 141   Potassium 3.6 - 5.5 mmol/L 4.2   Chloride 96 - 112 mmol/L 106   Co2 20 - 33 mmol/L 20   Anion Gap 7.0 - 16.0  15.0   Glucose 65 - 99 mg/dL 103 (H)   Bun 8 - 22 mg/dL 21   Creatinine 0.50 - 1.40 mg/dL 0.91   GFR (CKD-EPI) >60 mL/min/1.73 m 2 66   Calcium 8.5 - 10.5 mg/dL 9.8   Correct Calcium 8.5 - 10.5 mg/dL 9.8   AST(SGOT) 12 - 45 U/L 25   ALT(SGPT) 2 - 50 U/L 18   Alkaline Phosphatase 30 - 99 U/L 93   Total Bilirubin 0.1 - 1.5 mg/dL 0.6   Albumin 3.2 - 4.9 g/dL 4.0   Total Protein 6.0 - 8.2 g/dL 6.8   Globulin 1.9 - 3.5 g/dL 2.8   A-G Ratio g/dL 1.4   Cholesterol,Tot 100 - 199 mg/dL 154   Triglycerides 0 - 149 mg/dL 125   HDL >=40 mg/dL 49   LDL <100 mg/dL 80   25-Hydroxy   Vitamin D 25 30 - 100 ng/mL 56   (H): Data is abnormally high    Narrative & Impression     4/21/2025 3:28 PM     HISTORY/REASON FOR EXAM:  Routine Mammographic Screening.        TECHNIQUE/EXAM DESCRIPTION:  Bilateral tomosynthesis screening mammography was performed with standard mammographic images generated from the data set. Images were reviewed and interpreted with CAD.     COMPARISON:   4/14/2023, 7/28/2021     FINDINGS:  There are scattered areas of fibroglandular density.     There is no dominant mass, suspicious calcification, or any secondary malignant sign.      Benign-appearing calcifications are present. Several circumscribed masses bilaterally, benign given multiplicity and bilaterality.     IMPRESSION:        1.  Breasts have scattered areas of fibroglandular density, with no radiographic evidence of malignancy.     2.  Screening mammogram in one year is recommended.     R2 - CATEGORY 2: BENIGN FINDING(S)     Ten to twenty percent of all cancers can be categorized as hereditary and the clinical and financial value of identifying patients and families at risk is well documented. If you have a personal or family history of breast, ovarian, fallopian tube,   peritoneal or other cancer, please consult your physician regarding genetic counseling and testing. [facility] is offering no cost genetic screening for hereditary breast and ovarian cancer. For more information, discuss with your   provider, sign-up through [employer], or visit [email or web address] to learn more.        Exam Ended: 04/21/25  3:51 PM Last Resulted: 04/22/25  4:18 PM       Narrative & Impression     4/21/2025 2:51 PM     HISTORY/REASON FOR EXAM:  Postmenopausal, hysterectomy, adult bone fracture.     TECHNIQUE/EXAM DESCRIPTION AND NUMBER OF VIEWS:  Dual x-ray bone densitometry was performed from the L3 level and from the proximal left femur utilizing the GE Prodigy unit.     COMPARISON: None     FINDINGS:  The lumbar spine has a mean bone mineral density of 1.096 g/cm2, with a T score of -0.9 and a Z score of -0.3.     The proximal left femur has a mean bone mineral density of 0.733 g/cm2, with a T score of -2.2 and a Z score of -1.3.     IMPRESSION:     According to the World Health Organization classification, bone mineral density of this patient is normal in the lumbar spine and osteopenic in the proximal left femur.     10-year Probability of Fracture:  Major Osteoporotic     19.7%  Hip     4.9%  Population      [place] ()     Based on left femur neck BMD     INTERPRETING  LOCATION: [address] [place] [address], [place], [id number]        Exam Ended: 04/21/25  3:22 PM Last Resulted: 04/21/25 10:52 PM     ASSESSMENT/PLAN:    74 y.o.female       1. Osteopenia of neck of left femur        2. Vitamin D insufficiency  VITAMIN D,25 HYDROXY (DEFICIENCY)      3. Mild intermittent asthma without complication        4. Essential hypertension  Comp Metabolic Panel      5. Mixed hyperlipidemia        6. Elevated fasting glucose  HEMOGLOBIN A1C      7. Elevated alkaline phosphatase level  Comp Metabolic Panel      8. Encounter for completion of form with patient          -Osteopenia-first DEXA scan completed.  Recommend adequate calcium vitamin D intake.  Recommend daily routine weightbearing exercise activities.  Declined physical therapy at this time.  Reviewed may consider medication therapy which she does not desire at this time.  Plan to repeat DEXA within 2 years.  -Vitamin D insufficiency-improved.  Continue daily vitamin D3 2000 IU supplement.  Plan to repeat labs in future.  -Asthma-slight increase in symptoms recently likely related to allergy component.  She will try Zyrtec or Claritin over-the-counter at this time.  May consider Singulair therapy trial in future as desired.  -Hypertension-stable.  Continue losartan 25 mg daily.  -Hyperlipidemia-improved and stable.  Plan for annual lab work.  Continue working on healthy diet and routine exercise activities.  -Elevated fasting glucose-minimal.  Monitor.  Continue working on healthy diet and routine exercise activities.  -Elevated alkaline phosphatase-improved from prior.  Monitor in future.  -DMV form completed given to patient.  Eye exam per eye specialist.    Plan for follow-up in 6 months.  Follow-up sooner as needed.      This medical record contains text that has been entered with the assistance of computer voice recognition and dictation software.  Therefore, it may contain unintended errors in text, spelling, punctuation, or  divina.                                 [1]   Current Outpatient Medications on File Prior to Visit   Medication Sig Dispense Refill    losartan (COZAAR) 25 MG Tab TAKE 1 TABLET BY MOUTH EVERY  Tablet 0    Non Formulary Request Preser vision x 2 daily      albuterol 108 (90 Base) MCG/ACT Aero Soln inhalation aerosol Inhale 2 Puffs every four hours as needed for Shortness of Breath. 8.5 g 3    atorvastatin (LIPITOR) 10 MG Tab Take 1 Tablet by mouth at bedtime. 100 Tablet 3    coenzyme Q-10 30 MG capsule Take 60 mg by mouth every day.      therapeutic multivitamin-minerals (THERAGRAN-M) Tab Take 1 Tab by mouth every day.       No current facility-administered medications on file prior to visit.   [2]   Allergies  Allergen Reactions    Ampicillin Hives    Penicillins    [3]   Past Medical History:  Diagnosis Date    Hyperlipidemia     Psoriasis     Recovering alcoholic (HCC)     since June 1986